# Patient Record
Sex: FEMALE | Race: BLACK OR AFRICAN AMERICAN | NOT HISPANIC OR LATINO | Employment: FULL TIME | URBAN - METROPOLITAN AREA
[De-identification: names, ages, dates, MRNs, and addresses within clinical notes are randomized per-mention and may not be internally consistent; named-entity substitution may affect disease eponyms.]

---
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
## 2022-04-18 ENCOUNTER — HOSPITAL ENCOUNTER (EMERGENCY)
Facility: HOSPITAL | Age: 22
Discharge: HOME | End: 2022-04-18
Attending: EMERGENCY MEDICINE
Payer: MEDICAID

## 2022-04-18 VITALS
TEMPERATURE: 98 F | DIASTOLIC BLOOD PRESSURE: 98 MMHG | WEIGHT: 209 LBS | HEART RATE: 78 BPM | SYSTOLIC BLOOD PRESSURE: 137 MMHG | OXYGEN SATURATION: 100 % | BODY MASS INDEX: 45.09 KG/M2 | RESPIRATION RATE: 18 BRPM | HEIGHT: 57 IN
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
## 2022-04-18 PROCEDURE — 25000003 PHARM REV CODE 250: Performed by: EMERGENCY MEDICINE

## 2022-04-18 PROCEDURE — 99283 EMERGENCY DEPT VISIT LOW MDM: CPT

## 2022-04-18 RX ORDER — ACETAMINOPHEN 500 MG
1000 TABLET ORAL
Status: COMPLETED | OUTPATIENT
Start: 2022-04-18 | End: 2022-04-18

## 2022-04-18 RX ORDER — VALACYCLOVIR HYDROCHLORIDE 1 G/1
1000 TABLET, FILM COATED ORAL DAILY
COMMUNITY
Start: 2022-03-22 | End: 2023-03-19 | Stop reason: CLARIF

## 2022-04-18 RX ADMIN — ACETAMINOPHEN 1000 MG: 500 TABLET, FILM COATED ORAL at 11:04

## 2022-04-18 NOTE — Clinical Note
"Margret Soto" Evans was seen and treated in our emergency department on 4/18/2022.  She may return to work on 04/20/2022.       If you have any questions or concerns, please don't hesitate to call.      danny beckett MD    "

## 2022-04-18 NOTE — Clinical Note
"Margret Wright (Dajanna)armando was seen and treated in our emergency department on 4/18/2022.  She may return to work on 04/20/2022.       If you have any questions or concerns, please don't hesitate to call.      danny beckett RN    "
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
## 2024-07-12 ENCOUNTER — TELEPHONE (OUTPATIENT)
Dept: MATERNAL FETAL MEDICINE | Facility: CLINIC | Age: 24
End: 2024-07-12
Payer: MEDICAID

## 2024-07-12 DIAGNOSIS — O24.419 GESTATIONAL DIABETES MELLITUS (GDM) IN SECOND TRIMESTER, GESTATIONAL DIABETES METHOD OF CONTROL UNSPECIFIED: Primary | ICD-10-CM

## 2024-07-14 ENCOUNTER — HOSPITAL ENCOUNTER (EMERGENCY)
Facility: HOSPITAL | Age: 24
Discharge: LEFT BEFORE DISCHARGE | End: 2024-07-14
Attending: EMERGENCY MEDICINE
Payer: MEDICAID

## 2024-07-14 VITALS
HEIGHT: 55 IN | TEMPERATURE: 98 F | RESPIRATION RATE: 16 BRPM | DIASTOLIC BLOOD PRESSURE: 81 MMHG | WEIGHT: 222 LBS | OXYGEN SATURATION: 97 % | HEART RATE: 89 BPM | BODY MASS INDEX: 51.38 KG/M2 | SYSTOLIC BLOOD PRESSURE: 117 MMHG

## 2024-07-14 PROCEDURE — 99900041 HC LEFT WITHOUT BEING SEEN- EMERGENCY

## 2024-07-14 NOTE — ED NOTES
Called pt. Cell phone to notify room available in ED, pt. Reports she was removed from ED lobby by hospital security, and is on her way to [facility]. House Supervisor notified.

## 2024-07-15 ENCOUNTER — TELEPHONE (OUTPATIENT)
Dept: MATERNAL FETAL MEDICINE | Facility: CLINIC | Age: 24
End: 2024-07-15
Payer: MEDICAID

## 2024-07-15 ENCOUNTER — PATIENT MESSAGE (OUTPATIENT)
Dept: MATERNAL FETAL MEDICINE | Facility: CLINIC | Age: 24
End: 2024-07-15
Payer: MEDICAID

## 2024-07-15 DIAGNOSIS — O24.419 GESTATIONAL DIABETES MELLITUS (GDM) IN SECOND TRIMESTER, GESTATIONAL DIABETES METHOD OF CONTROL UNSPECIFIED: Primary | ICD-10-CM

## 2024-07-15 NOTE — TELEPHONE ENCOUNTER
Called patient based on referral from Dr Martinez office.    Patient states Dr Martinez told her she was very high risk due to her diabetes and we would be seeing her every week.    Explained to patient that we will start with first appointment and let Dr Velasquez evaluate her.    History obtained and updated in Epic.    Appointment with ultrasound scheduled for 7/17/24    Will send [employer] message with contact info and office location.

## 2024-07-17 ENCOUNTER — PROCEDURE VISIT (OUTPATIENT)
Dept: MATERNAL FETAL MEDICINE | Facility: CLINIC | Age: 24
End: 2024-07-17
Payer: MEDICAID

## 2024-07-17 ENCOUNTER — TELEPHONE (OUTPATIENT)
Dept: MATERNAL FETAL MEDICINE | Facility: CLINIC | Age: 24
End: 2024-07-17

## 2024-07-17 ENCOUNTER — OFFICE VISIT (OUTPATIENT)
Dept: MATERNAL FETAL MEDICINE | Facility: CLINIC | Age: 24
End: 2024-07-17
Payer: MEDICAID

## 2024-07-17 DIAGNOSIS — O99.212 OBESITY AFFECTING PREGNANCY IN SECOND TRIMESTER, UNSPECIFIED OBESITY TYPE: ICD-10-CM

## 2024-07-17 DIAGNOSIS — O24.410 DIET CONTROLLED GESTATIONAL DIABETES MELLITUS (GDM) IN SECOND TRIMESTER: ICD-10-CM

## 2024-07-17 DIAGNOSIS — O24.419 GESTATIONAL DIABETES MELLITUS (GDM) IN SECOND TRIMESTER, GESTATIONAL DIABETES METHOD OF CONTROL UNSPECIFIED: ICD-10-CM

## 2024-07-17 DIAGNOSIS — Z36.89 ENCOUNTER FOR FETAL ANATOMIC SURVEY: Primary | ICD-10-CM

## 2024-07-17 DIAGNOSIS — O24.419 GESTATIONAL DIABETES MELLITUS (GDM) IN SECOND TRIMESTER, GESTATIONAL DIABETES METHOD OF CONTROL UNSPECIFIED: Primary | ICD-10-CM

## 2024-07-17 PROCEDURE — 99213 OFFICE O/P EST LOW 20 MIN: CPT | Mod: PBBFAC,TH,PN | Performed by: STUDENT IN AN ORGANIZED HEALTH CARE EDUCATION/TRAINING PROGRAM

## 2024-07-17 PROCEDURE — 99999 PR PBB SHADOW E&M-EST. PATIENT-LVL III: CPT | Mod: PBBFAC,,, | Performed by: STUDENT IN AN ORGANIZED HEALTH CARE EDUCATION/TRAINING PROGRAM

## 2024-07-17 PROCEDURE — 76805 OB US >/= 14 WKS SNGL FETUS: CPT | Mod: PBBFAC,PN | Performed by: STUDENT IN AN ORGANIZED HEALTH CARE EDUCATION/TRAINING PROGRAM

## 2024-07-17 NOTE — ASSESSMENT & PLAN NOTE
Gestational Diabetes  I counseled the patient regarding the risks of gestational diabetes, which include macrosomia, hypertensive disorders of pregnancy,  hypoglycemia, shoulder dystocia/birth trauma, polyhydramnios, and increased risk of  delivery.  Patients requiring medical therapy have an increased risk of stillbirth.  Discussed that  outcome is linked to her ability to achieve glycemic control.  The goal of treatment is to have a fasting blood sugar between 70 and 95 mg/dL and 2 hour postprandials less than 120 mg/dL.  Therapy will likely consist of therapy with metformin or insulin. Approximately 30-50% of the time, women who are well-controlled on metformin may require the addition of insulin as the pregnancy progresses. Glyburide therapy has demonstrated inferior results as compared to metformin and insulin, and therefore, is not a first-line choice. Antepartum testing is indicated for those patients requiring medical therapy to reduce the incidence of fetal demise. We discussed dietary counseling and appropriate exercise to improve peripheral insulin resistance. We discussed the frequency of blood sugar monitoring and goal blood sugars. She has not met with a diabetic educator this pregnancy. I would recommend obtaining serial growth ultrasounds in the third trimester to monitor for macrosomia.    Most women with GDM are cured by delivery. All medications can be stopped postpartum. However, some women with GDM have undiagnosed type 2 diabetes. Therefore, I recommend obtaining a postpartum fasting blood sugar prior to discharge. Approximately 20% of women with GDM will have glucose intolerance or type 2 DM at the postpartum visit. A 2 hour glucose tolerance test should be performed 6-8 weeks postpartum. If the patient is breastfeeding, it is reasonable to delay this as appropriate. Additionally, women with GDM are at increased risk of developing type 2 DM later in life. Therefore,  establishing with a primary MD who can perform annual diabetes screening is appropriate.       Diagnosed via early GTT with confirmatory 3 hr GTT. Reports she was screened for diabetes prior to pregnancy and was borderline. BMI of 54 today.   Logs reviewed. Some elevations noted however no need for medications at this point.Likely will need Metformin or insulin.  Recommendations:  MFM will review blood sugars in 1 week via portal; We reinforced checking 4 x/day and reinforced goal blood sugars  Regimen: Diet  Diabetic education referral placed  If medications are required, recommend growth scans every 4-6 weeks, starting at 28 weeks gestation  If medications are required, recommend starting antepartum testing at 32 weeks gestation (weekly NST+AFV or BPP); twice weekly testing is recommended if blood sugars are poorly controlled. She will require testing due to BMI  -Antepartum testing should be started at 40 weeks for women who do NOT require medications.  Check fasting blood sugar in hospital postpartum.   If normal, discontinue therapy and monitoring and recommend repeat postpartum glucose testing in 6-8 weeks postpartum using a 75 g oral glucose tolerance test.   If fasting blood glucose is between 100-125 mg/dl or impaired glucose tolerance is noted on 2 hour glucose test, refer to primary care as appropriate.   An ultrasound for estimated fetal weight should be obtained within 3 weeks of anticipated delivery; if the EFW is >= 4500 grams, a  should be offered.    Delivery timing:  Well controlled with diet: 39 0/7 - 40 6/7 weeks gestation  Oral agent or insulin required: 39 0/7 - 39 6/ 7 weeks gestation  Poorly controlled on oral agent or insulin: 38 0/7 - 38 6/7 weeks gestation

## 2024-07-17 NOTE — TELEPHONE ENCOUNTER
Called pt toe let her know she left her glucometer in the clinic room. Pt stated she is on her way to come pick it up.

## 2024-07-17 NOTE — PROGRESS NOTES
Referral placed for diabetic education and message sent to Gina Miguel and Neeru Baumann.   Blood sugar log scanned to media    Dr Martinez office notified of patient rash and her requesting meds,  Pics of rash are in media.   They stated they will see her 7/31/24.    Informed patient of above.      Regarding rash encouraged her to keep area dry, open to air when possible, use supportive bra to keep breasts from causing friction.  Follow up with Dr Martinez.

## 2024-07-17 NOTE — PROGRESS NOTES
"MATERNAL-FETAL MEDICINE   CONSULT NOTE    Provider requesting consultation: MD Michelle  SUBJECTIVE:   Ms. Margret Krueger is a 24 y.o.  female with IUP at 16w0d who is seen in consultation by MFM for evaluation and management of:  Problem   Gestational Diabetes Mellitus (Gdm) in Second Trimester   Obesity Affecting Pregnancy in Second Trimester        Medication List with Changes/Refills   Current Medications    ACYCLOVIR (ZOVIRAX) 400 MG TABLET    Take 1 tablet (400 mg total) by mouth 5 (five) times daily. for 5 days                        NYSTATIN (MYCOSTATIN) POWDER    Apply topically 2 (two) times daily. To area under breasts    SERTRALINE (ZOLOFT) 25 MG TABLET    Take 25 mg by mouth once daily.     Review of patient's allergies indicates:  No Known Allergies  OB History    Para Term  AB Living   1             SAB IAB Ectopic Multiple Live Births                  # Outcome Date GA Lbr Aquiles/2nd Weight Sex Type Anes PTL Lv   1 Current              Past Medical History:   Diagnosis Date    ADHD     Bipolar 1 disorder     Borderline diabetes      Past Surgical History:   Procedure Laterality Date    ADENOIDECTOMY      CHOLECYSTECTOMY      LAPAROSCOPIC CHOLECYSTECTOMY N/A 2022    Procedure: CHOLECYSTECTOMY, LAPAROSCOPIC;  Surgeon: Niall Mcduffie MD;  Location: [facility];  Service: General;  Laterality: N/A;    TYMPANOSTOMY TUBE PLACEMENT       Family history: negative for birth defects, recurrent miscarriages, chromosomal abnormalities.   Social History     Tobacco Use    Smoking status: Former     Types: Cigarettes, Cigars    Smokeless tobacco: Never    Tobacco comments:     Quit 2 months after getting pregnant   Substance Use Topics    Alcohol use: Not Currently    Drug use: Not Currently     Types: Marijuana     Comment: denies use as of 2023     Review of patient's allergies indicates:  No Known Allergies  Objective:   /79   Pulse 77   Ht 4' 6" (1.372 m)   Wt 102.5 kg " (225 lb 15.5 oz)   LMP 2024 (Exact Date)   BMI 54.48 kg/m²   Ultrasound performed. See viewpoint for full ultrasound report.  A lovett living IUP is identified. CHELSEA is consistent with stated CHELSEA.   A  Limited early anatomy appears unremarkable.  ASSESSMENT/PLAN:     24 y.o.  female with IUP at 16w0d     Gestational diabetes mellitus (GDM) in second trimester  Gestational Diabetes  I counseled the patient regarding the risks of gestational diabetes, which include macrosomia, hypertensive disorders of pregnancy,  hypoglycemia, shoulder dystocia/birth trauma, polyhydramnios, and increased risk of  delivery.  Patients requiring medical therapy have an increased risk of stillbirth.  Discussed that  outcome is linked to her ability to achieve glycemic control.  The goal of treatment is to have a fasting blood sugar between 70 and 95 mg/dL and 2 hour postprandials less than 120 mg/dL.  Therapy will likely consist of therapy with metformin or insulin. Approximately 30-50% of the time, women who are well-controlled on metformin may require the addition of insulin as the pregnancy progresses. Glyburide therapy has demonstrated inferior results as compared to metformin and insulin, and therefore, is not a first-line choice. Antepartum testing is indicated for those patients requiring medical therapy to reduce the incidence of fetal demise. We discussed dietary counseling and appropriate exercise to improve peripheral insulin resistance. We discussed the frequency of blood sugar monitoring and goal blood sugars. She has not met with a diabetic educator this pregnancy. I would recommend obtaining serial growth ultrasounds in the third trimester to monitor for macrosomia.    Most women with GDM are cured by delivery. All medications can be stopped postpartum. However, some women with GDM have undiagnosed type 2 diabetes. Therefore, I recommend obtaining a postpartum fasting blood sugar  prior to discharge. Approximately 20% of women with GDM will have glucose intolerance or type 2 DM at the postpartum visit. A 2 hour glucose tolerance test should be performed 6-8 weeks postpartum. If the patient is breastfeeding, it is reasonable to delay this as appropriate. Additionally, women with GDM are at increased risk of developing type 2 DM later in life. Therefore, establishing with a primary MD who can perform annual diabetes screening is appropriate.       Diagnosed via early GTT with confirmatory 3 hr GTT. Reports she was screened for diabetes prior to pregnancy and was borderline. BMI of 54 today.   Logs reviewed. Some elevations noted however no need for medications at this point.Likely will need Metformin or insulin.  Recommendations:  MFM will review blood sugars in 1 week via portal; We reinforced checking 4 x/day and reinforced goal blood sugars  Regimen: Diet  Diabetic education referral placed  If medications are required, recommend growth scans every 4-6 weeks, starting at 28 weeks gestation  If medications are required, recommend starting antepartum testing at 32 weeks gestation (weekly NST+AFV or BPP); twice weekly testing is recommended if blood sugars are poorly controlled. She will require testing due to BMI  -Antepartum testing should be started at 40 weeks for women who do NOT require medications.  Check fasting blood sugar in hospital postpartum.   If normal, discontinue therapy and monitoring and recommend repeat postpartum glucose testing in 6-8 weeks postpartum using a 75 g oral glucose tolerance test.   If fasting blood glucose is between 100-125 mg/dl or impaired glucose tolerance is noted on 2 hour glucose test, refer to primary care as appropriate.   An ultrasound for estimated fetal weight should be obtained within 3 weeks of anticipated delivery; if the EFW is >= 4500 grams, a  should be offered.    Delivery timing:  Well controlled with diet: 39 0/7 - 40 6/7 weeks  gestation  Oral agent or insulin required: 39 0/7 - 39 6/ 7 weeks gestation  Poorly controlled on oral agent or insulin: 38 0/7 - 38 6/7 weeks gestation      Obesity affecting pregnancy in second trimester  Obesity  Obesity, especially class III obesity (BMI >= 40) is associated with numerous adverse pregnancy outcomes. These include but are not limited to miscarriage, poor ultrasound visualization, increase in congenital malformations (especially NTD's ),  birth, ascending infections, gestational diabetes, hypertensive diseases of pregnancy, macrosomia, shoulder dystocia, cerebral palsy and surgical complications such as wound infections, dehiscence and thrombosis. Macrosomia and the incidence of intrapartum complications related to macrosomia, such as shoulder dystocia, malpresentation, hemorrhage, and fourth degree laceration are also increased in obese gravidas. Obese women also have a high rate of  delivery and are more likely to have surgical, anesthetic, or postpartum complications.  birth in obese gravidas is primarily associated with obesity-related medical and  complications, rather than an intrinsic predisposition to spontaneous  labor.     Recommendations:  Recommended gestational weight gain of 11-20 pounds. Consider dietary counseling.  Initiate low dose aspirin 81 mg daily for preeclampsia risk reduction at 12-16 weeks  Patients should receive adequate counseling regarding risk factors and possible difficulties with accurate labor monitoring,  delivery operative complications, and anesthesia.    Screen for signs/symptoms of obstructive sleep apnea.  Because of the increased risk of congenital anomalies, all patients with a BMI ? 35 should undergo targeted ultrasound between 18-20 weeks gestation. If abnormal screening views or cardiac anomalies are noted, or if the patient has concomitant diabetes, formal fetal echocardiography should be performed.   Fetal  growth assessment via fundal height monitoring is difficult in the setting of obesity. When the fundal height cannot be reliably followed or BMI ? 40, ultrasound evaluation of fetal growth is recommended at 32 weeks or sooner if other co-morbidities or indications exist.   Patients with a BMI of 40 or greater are at the highest risk for adverse pregnancy outcome, including stillbirth. Therefore, weekly  surveillance is recommended beginning at 34 weeks until delivery; other co-morbidities may require more frequent testing.  Patients with obesity are at increased risk for cardiac and pulmonary complications including sleep apnea, hypertension and cardiomyopathy. Providers should have a low threshold to seek further evaluation of the cardiac status--echocardiogram, EKG--in patients who present with symptoms suggestive of cardiac or pulmonary compromise. In addition, given the risk of anesthetic difficulties, consultation with an anesthesiologist is suggested for patients with a BMI ? 50, maternal weight ?400 lb, or patients with sleep apnea.  Obese patients should be followed closely especially in the third trimester to screen for hypertensive complications.  Lovenox 40 mg BID for VTE prophylaxis while admitted to the hospital (antepartum or postpartum) if BMI >= 40.   Patients should be counseled on weight loss in the postpartum period.      Of note, seen in ER for rash below breasts and given Nystatin powder. She reports minimal relief.Will reach out to Dr. Martinez office today to have her f/u     FOLLOW UP:   F/u in 18-20 weeks for targeted anatomy US/MFM visit      Norma Velasquez  Maternal-Fetal Medicine    Electronically Signed by Norma Velasquez 2024

## 2024-07-17 NOTE — ASSESSMENT & PLAN NOTE
Obesity  Obesity, especially class III obesity (BMI >= 40) is associated with numerous adverse pregnancy outcomes. These include but are not limited to miscarriage, poor ultrasound visualization, increase in congenital malformations (especially NTD's ),  birth, ascending infections, gestational diabetes, hypertensive diseases of pregnancy, macrosomia, shoulder dystocia, cerebral palsy and surgical complications such as wound infections, dehiscence and thrombosis. Macrosomia and the incidence of intrapartum complications related to macrosomia, such as shoulder dystocia, malpresentation, hemorrhage, and fourth degree laceration are also increased in obese gravidas. Obese women also have a high rate of  delivery and are more likely to have surgical, anesthetic, or postpartum complications.  birth in obese gravidas is primarily associated with obesity-related medical and  complications, rather than an intrinsic predisposition to spontaneous  labor.     Recommendations:  Recommended gestational weight gain of 11-20 pounds. Consider dietary counseling.  Initiate low dose aspirin 81 mg daily for preeclampsia risk reduction at 12-16 weeks  Patients should receive adequate counseling regarding risk factors and possible difficulties with accurate labor monitoring,  delivery operative complications, and anesthesia.    Screen for signs/symptoms of obstructive sleep apnea.  Because of the increased risk of congenital anomalies, all patients with a BMI ? 35 should undergo targeted ultrasound between 18-20 weeks gestation. If abnormal screening views or cardiac anomalies are noted, or if the patient has concomitant diabetes, formal fetal echocardiography should be performed.   Fetal growth assessment via fundal height monitoring is difficult in the setting of obesity. When the fundal height cannot be reliably followed or BMI ? 40, ultrasound evaluation of fetal growth is recommended at  32 weeks or sooner if other co-morbidities or indications exist.   Patients with a BMI of 40 or greater are at the highest risk for adverse pregnancy outcome, including stillbirth. Therefore, weekly  surveillance is recommended beginning at 34 weeks until delivery; other co-morbidities may require more frequent testing.  Patients with obesity are at increased risk for cardiac and pulmonary complications including sleep apnea, hypertension and cardiomyopathy. Providers should have a low threshold to seek further evaluation of the cardiac status--echocardiogram, EKG--in patients who present with symptoms suggestive of cardiac or pulmonary compromise. In addition, given the risk of anesthetic difficulties, consultation with an anesthesiologist is suggested for patients with a BMI ? 50, maternal weight ?400 lb, or patients with sleep apnea.  Obese patients should be followed closely especially in the third trimester to screen for hypertensive complications.  Lovenox 40 mg BID for VTE prophylaxis while admitted to the hospital (antepartum or postpartum) if BMI >= 40.   Patients should be counseled on weight loss in the postpartum period.

## 2024-07-18 VITALS
SYSTOLIC BLOOD PRESSURE: 124 MMHG | WEIGHT: 226 LBS | HEIGHT: 60 IN | BODY MASS INDEX: 44.37 KG/M2 | HEART RATE: 77 BPM | DIASTOLIC BLOOD PRESSURE: 79 MMHG

## 2024-08-01 ENCOUNTER — PROCEDURE VISIT (OUTPATIENT)
Dept: MATERNAL FETAL MEDICINE | Facility: CLINIC | Age: 24
End: 2024-08-01
Payer: MEDICAID

## 2024-08-01 ENCOUNTER — OFFICE VISIT (OUTPATIENT)
Dept: MATERNAL FETAL MEDICINE | Facility: CLINIC | Age: 24
End: 2024-08-01
Payer: MEDICAID

## 2024-08-01 VITALS
BODY MASS INDEX: 52.55 KG/M2 | DIASTOLIC BLOOD PRESSURE: 77 MMHG | HEIGHT: 55 IN | WEIGHT: 227.06 LBS | SYSTOLIC BLOOD PRESSURE: 127 MMHG

## 2024-08-01 VITALS
DIASTOLIC BLOOD PRESSURE: 77 MMHG | HEIGHT: 56 IN | BODY MASS INDEX: 50.66 KG/M2 | HEART RATE: 81 BPM | SYSTOLIC BLOOD PRESSURE: 127 MMHG

## 2024-08-01 DIAGNOSIS — Z36.2 ENCOUNTER FOR FOLLOW-UP ULTRASOUND OF FETAL ANATOMY: Primary | ICD-10-CM

## 2024-08-01 DIAGNOSIS — O24.410 DIET CONTROLLED GESTATIONAL DIABETES MELLITUS (GDM) IN SECOND TRIMESTER: ICD-10-CM

## 2024-08-01 DIAGNOSIS — O99.212 OBESITY AFFECTING PREGNANCY IN SECOND TRIMESTER, UNSPECIFIED OBESITY TYPE: ICD-10-CM

## 2024-08-01 DIAGNOSIS — O24.410 DIET CONTROLLED GESTATIONAL DIABETES MELLITUS (GDM) IN SECOND TRIMESTER: Primary | ICD-10-CM

## 2024-08-01 DIAGNOSIS — O24.419 GESTATIONAL DIABETES MELLITUS (GDM) IN SECOND TRIMESTER, GESTATIONAL DIABETES METHOD OF CONTROL UNSPECIFIED: ICD-10-CM

## 2024-08-01 DIAGNOSIS — Z36.89 ENCOUNTER FOR FETAL ANATOMIC SURVEY: ICD-10-CM

## 2024-08-01 PROCEDURE — 76811 OB US DETAILED SNGL FETUS: CPT | Mod: PBBFAC,PN | Performed by: STUDENT IN AN ORGANIZED HEALTH CARE EDUCATION/TRAINING PROGRAM

## 2024-08-01 NOTE — PROGRESS NOTES
"Maternal Fetal Medicine Follow up  SUBJECTIVE:     Margret Krueger is a 24 y.o.  female with IUP at 18w1d who is seen for MFM follow up for management of:    Problem   Obesity Affecting Pregnancy in Second Trimester   Gestational Diabetes Mellitus (Gdm) in Second Trimester     Previous notes reviewed.   No changes to medical, surgical, family, social, or obstetric history.    Review of patient's allergies indicates:   Allergen Reactions    Ether for anesthesia Other (See Comments)     "The medicine when you get put to sleep for surgery. It makes me jump."     Care team members:  MD Michelle - Primary OB  OBJECTIVE:   Blood Pressure: LMP 2024 (Exact Date)   Ultrasound performed. See viewpoint for full ultrasound report.  A detailed fetal anatomic ultrasound examination was performed for the following high risk indication: high maternal BMI.   No fetal structural malformations are identified; however, fetal imaging is incomplete today.   A follow-up study will be scheduled to complete the fetal anatomic survey.   Fetal size today is consistent with established gestational age.   Cervical length by TA scanning is normal.   Placental location is anterior without evidence of previa.   ASSESSMENT/PLAN:     24 y.o.  female with IUP at 18w1d presents for MFM follow up.    Gestational diabetes mellitus (GDM) in second trimester  Gestational Diabetes  Previously counseled- see prior notes for full recommendations  Margret does not have her blood glucose logs with her with the exception of the last 2 days. I reviewed these values. She has one elevated fasting value this morning at 95 and a 2 hr postprandial dinner value at 120. Other values within normal limits. I reinforced importance of logging and we have given her another blood glucose log  Recommendations:  MFM will review blood sugars in 1 week virtually. We reinforced checking 4 x/day and reinforced goal blood sugars  Regimen: Diet  She missed her diabetic " education visit- primary OB to follow up        Obesity affecting pregnancy in second trimester  Previously counseled    FOLLOW UP:   1 wk virtual visit for BG assessment  4 wk f/u anatomy and MD visit  Norma Velasquez  Maternal-Fetal Medicine    Electronically Signed by Norma Velasquez August 1, 2024

## 2024-08-01 NOTE — PROGRESS NOTES
"Pt with mother at bedside. Mother states patients brother has a heart murmur and "caught a heart attack " when he was "little". Reports around age 6 or 7. Brother is currently 24 years old.  Mother also reports having a heart attack at age 39.   Pt states she stopped smoking but has started back. Requests medicine to help her stop smoking. States she has stopped taking Zoloft because she has not seen the PCP lately and due to pregnancy.   BS check today. Levels recorded on phone.       7/30/24 - 80, 115, 120    7/31/24-  91, 108, 105    8/1/24-    95  "
97.5

## 2024-08-05 DIAGNOSIS — O24.410 DIET CONTROLLED GESTATIONAL DIABETES MELLITUS (GDM) IN SECOND TRIMESTER: ICD-10-CM

## 2024-08-05 DIAGNOSIS — O24.419 GESTATIONAL DIABETES MELLITUS (GDM) IN SECOND TRIMESTER, GESTATIONAL DIABETES METHOD OF CONTROL UNSPECIFIED: Primary | ICD-10-CM

## 2024-08-07 ENCOUNTER — OFFICE VISIT (OUTPATIENT)
Dept: MATERNAL FETAL MEDICINE | Facility: CLINIC | Age: 24
End: 2024-08-07
Payer: MEDICAID

## 2024-08-07 ENCOUNTER — PATIENT MESSAGE (OUTPATIENT)
Dept: MATERNAL FETAL MEDICINE | Facility: CLINIC | Age: 24
End: 2024-08-07

## 2024-08-07 DIAGNOSIS — O24.419 GESTATIONAL DIABETES MELLITUS (GDM) IN SECOND TRIMESTER, GESTATIONAL DIABETES METHOD OF CONTROL UNSPECIFIED: Primary | ICD-10-CM

## 2024-08-07 DIAGNOSIS — O24.410 DIET CONTROLLED GESTATIONAL DIABETES MELLITUS (GDM) IN SECOND TRIMESTER: Primary | ICD-10-CM

## 2024-08-07 RX ORDER — PEN NEEDLE, DIABETIC 29 G X1/2"
NEEDLE, DISPOSABLE MISCELLANEOUS
COMMUNITY
Start: 2024-08-07

## 2024-08-13 ENCOUNTER — PATIENT MESSAGE (OUTPATIENT)
Dept: MATERNAL FETAL MEDICINE | Facility: CLINIC | Age: 24
End: 2024-08-13
Payer: MEDICAID

## 2024-08-19 ENCOUNTER — TELEPHONE (OUTPATIENT)
Dept: MATERNAL FETAL MEDICINE | Facility: CLINIC | Age: 24
End: 2024-08-19
Payer: MEDICAID

## 2024-08-19 NOTE — TELEPHONE ENCOUNTER
Spoke with Margret and asked her to send in a week of blood sugars as we had not seen them recently.    She said she would.     Reminded her to check her Tapp[employer] messages.

## 2024-08-19 NOTE — TELEPHONE ENCOUNTER
Patient sent in blood sugars.   Transcribed on to bs form.  Dr Velasquez reviewed.   No changes to regimen at this time.    Patient notified and will send in blood sugars again x 1 week.

## 2024-08-22 ENCOUNTER — TELEPHONE (OUTPATIENT)
Dept: MATERNAL FETAL MEDICINE | Facility: CLINIC | Age: 24
End: 2024-08-22
Payer: MEDICAID

## 2024-08-22 NOTE — TELEPHONE ENCOUNTER
Pt called and stated she has school on Thursday 8/29 and needs a different appt time and asked if we had any appts available on Tuesday 8/27. Advised we have an appt on Tuesday at 2:40pm. Pt said she will ask her mom if she can bring her at that time and will call us back.

## 2024-08-27 ENCOUNTER — PROCEDURE VISIT (OUTPATIENT)
Dept: MATERNAL FETAL MEDICINE | Facility: CLINIC | Age: 24
End: 2024-08-27
Payer: MEDICAID

## 2024-08-27 DIAGNOSIS — O24.419 GESTATIONAL DIABETES MELLITUS (GDM) IN SECOND TRIMESTER, GESTATIONAL DIABETES METHOD OF CONTROL UNSPECIFIED: ICD-10-CM

## 2024-08-27 DIAGNOSIS — Z36.2 ENCOUNTER FOR FOLLOW-UP ULTRASOUND OF FETAL ANATOMY: ICD-10-CM

## 2024-08-27 PROCEDURE — 76816 OB US FOLLOW-UP PER FETUS: CPT | Mod: PBBFAC,PN | Performed by: STUDENT IN AN ORGANIZED HEALTH CARE EDUCATION/TRAINING PROGRAM

## 2024-09-03 ENCOUNTER — OFFICE VISIT (OUTPATIENT)
Dept: MATERNAL FETAL MEDICINE | Facility: CLINIC | Age: 24
End: 2024-09-03
Payer: MEDICAID

## 2024-09-03 ENCOUNTER — TELEPHONE (OUTPATIENT)
Dept: MATERNAL FETAL MEDICINE | Facility: CLINIC | Age: 24
End: 2024-09-03
Payer: MEDICAID

## 2024-09-03 ENCOUNTER — PATIENT MESSAGE (OUTPATIENT)
Dept: MATERNAL FETAL MEDICINE | Facility: CLINIC | Age: 24
End: 2024-09-03

## 2024-09-03 DIAGNOSIS — O24.410 DIET CONTROLLED GESTATIONAL DIABETES MELLITUS (GDM) IN SECOND TRIMESTER: Primary | ICD-10-CM

## 2024-09-03 PROCEDURE — 99213 OFFICE O/P EST LOW 20 MIN: CPT | Mod: TH,95,, | Performed by: STUDENT IN AN ORGANIZED HEALTH CARE EDUCATION/TRAINING PROGRAM

## 2024-09-03 PROCEDURE — 1160F RVW MEDS BY RX/DR IN RCRD: CPT | Mod: CPTII,95,, | Performed by: STUDENT IN AN ORGANIZED HEALTH CARE EDUCATION/TRAINING PROGRAM

## 2024-09-03 PROCEDURE — 1159F MED LIST DOCD IN RCRD: CPT | Mod: CPTII,95,, | Performed by: STUDENT IN AN ORGANIZED HEALTH CARE EDUCATION/TRAINING PROGRAM

## 2024-09-03 NOTE — PROGRESS NOTES
"The patient location is: Home- office  The chief complaint leading to consultation is: Blood sugar assessment    Visit type: audiovisual    Face to Face time with patient: 10  20 minutes of total time spent on the encounter, which includes face to face time and non-face to face time preparing to see the patient (eg, review of tests), Obtaining and/or reviewing separately obtained history, Documenting clinical information in the electronic or other health record, Independently interpreting results (not separately reported) and communicating results to the patient/family/caregiver, or Care coordination (not separately reported).         Each patient to whom he or she provides medical services by telemedicine is:  (1) informed of the relationship between the physician and patient and the respective role of any other health care provider with respect to management of the patient; and (2) notified that he or she may decline to receive medical services by telemedicine and may withdraw from such care at any time.    Notes:     Maternal Fetal Medicine Follow up  SUBJECTIVE:     Margret Krueger is a 24 y.o.  female with IUP at 22w6d who is seen for MFM follow up for management of:    Problem   Gestational Diabetes Mellitus (Gdm) in Second Trimester     Previous notes reviewed.   No changes to medical, surgical, family, social, or obstetric history.    Interval history since last MFM visit: She missed her diabetic education visit    Review of patient's allergies indicates:   Allergen Reactions    Ether for anesthesia Other (See Comments)     "The medicine when you get put to sleep for surgery. It makes me jump."     Care team members:  MD Michelle - Primary OB  ASSESSMENT/PLAN:     24 y.o.  female with IUP at 22w6d presents for MFM follow up.    Gestational diabetes mellitus (GDM) in second trimester  Gestational Diabetes  Previously counseled- see prior notes for full recommendations  Margret has 4 days  of blood " glucose logs. All within normal range except one value. Her values are similar to her last visit.  She had many questions about diet and what to eat. She missed her diabetic education visit so I've placed another referral.  Recommendations:  MFM will review blood sugars in 2 week via portal. We reinforced checking 4 x/day and reinforced goal blood sugars  Regimen: Diet  Referral to diabetic education  Follow up growth US in 4 weeks with MFM visit        FOLLOW UP:   BS assessment in 2 weeks  Growth US with MFM in 4 weeks  Norma Velasquez  Maternal-Fetal Medicine    Electronically Signed by Norma Velasquez September 3, 2024

## 2024-09-03 NOTE — ASSESSMENT & PLAN NOTE
Gestational Diabetes  Previously counseled- see prior notes for full recommendations  Margret has 4 days  of blood glucose logs. All within normal range except one value. Her values are similar to her last visit.  She had many questions about diet and what to eat. She missed her diabetic education visit so I've placed another referral.  Recommendations:  MFM will review blood sugars in 2 week via portal. We reinforced checking 4 x/day and reinforced goal blood sugars  Regimen: Diet  Referral to diabetic education  Follow up growth US in 4 weeks with MFM visit

## 2024-09-04 ENCOUNTER — DOCUMENTATION ONLY (OUTPATIENT)
Dept: MATERNAL FETAL MEDICINE | Facility: CLINIC | Age: 24
End: 2024-09-04
Payer: MEDICAID

## 2024-09-04 NOTE — PROGRESS NOTES
"Spoke with patient this morning. Pt reports fasting  after going to a "buffet at about 8 o'clock". Pt confirms she has not attended diabetic education. No Show appt noted in chart from 7/22/24. Pt agrees to virtual diabetic education visit. Referral for diabetic education noted in chart.   Message sent to Diab. Educator for scheduling.     Pt instructed to record blood sugars for review. Monitoring 4 x daily reinforced with good hand washing. Pt verbalizes understanding.  Pt instructed to send blood sugar record to [facility] via patient portal.   "

## 2024-09-13 ENCOUNTER — PATIENT MESSAGE (OUTPATIENT)
Dept: MATERNAL FETAL MEDICINE | Facility: CLINIC | Age: 24
End: 2024-09-13
Payer: MEDICAID

## 2024-09-13 ENCOUNTER — TELEPHONE (OUTPATIENT)
Dept: MATERNAL FETAL MEDICINE | Facility: CLINIC | Age: 24
End: 2024-09-13
Payer: MEDICAID

## 2024-09-13 NOTE — TELEPHONE ENCOUNTER
Called patient back regarding her blood sugars.    She is currently in [facility] her baby wasn't moving.  Pt admits to not having eaten all day.    Reinforced need for patient to eat regular meals and to fill out the blood sugar sheet.  Sent her another sheet thru [facility].  She states baby is moving well now.    Will fu with her and her blood sugars on Monday in anticipation of there virtual visit with us on Tuesday 9/17

## 2024-09-17 ENCOUNTER — TELEPHONE (OUTPATIENT)
Dept: MATERNAL FETAL MEDICINE | Facility: CLINIC | Age: 24
End: 2024-09-17
Payer: MEDICAID

## 2024-09-17 ENCOUNTER — DOCUMENTATION ONLY (OUTPATIENT)
Dept: MATERNAL FETAL MEDICINE | Facility: CLINIC | Age: 24
End: 2024-09-17
Payer: MEDICAID

## 2024-09-17 NOTE — TELEPHONE ENCOUNTER
"Called pt to discuss BS log, asked if she had BS log for Sept 4-13. Pt stated she doesn't have those. Also, asked for clarification on why she only has 3 BS logged per day and what meal she isn't taking them for. Pt stated "she falls asleep before eating dinner" and doesn't take that one. Advised, I will make a note of this for the  and reminded her of her virtual appt today at 1pm.   "

## 2024-09-17 NOTE — TELEPHONE ENCOUNTER
Called pt to remind her to send in blood sugars prior to virtual appt today. Pt stated she missed a few because she didn't eat a meal yesterday. Advised pt to send the BS log that she has for the last 7 days and to make sure they are dated. Pt verbalized understanding.

## 2024-09-17 NOTE — TELEPHONE ENCOUNTER
Called pt to let her know that her virtual for today has been cancelled and we will see her next Wednesday for an in person appt and an US following her diabetic education appt this Thursday. Pt verbalized understanding.

## 2024-09-17 NOTE — PROGRESS NOTES
Patient sent in blood sugars without times or dates on them and only sent in 4 days worth, some of which are identical to ones sent last week.    Dr Velasquez said not enough information to have a visit today.  Patient should go to diabetic education as scheduled and return for her scheduled appt with us on 9/25.    Diabetic education notified of possible need for Dexcom. And to write sugars down from that, on our form and submit them weekly.

## 2024-09-24 ENCOUNTER — PATIENT MESSAGE (OUTPATIENT)
Dept: MATERNAL FETAL MEDICINE | Facility: CLINIC | Age: 24
End: 2024-09-24
Payer: MEDICAID

## 2024-09-25 ENCOUNTER — PROCEDURE VISIT (OUTPATIENT)
Dept: MATERNAL FETAL MEDICINE | Facility: CLINIC | Age: 24
End: 2024-09-25
Payer: MEDICAID

## 2024-09-25 ENCOUNTER — OFFICE VISIT (OUTPATIENT)
Dept: MATERNAL FETAL MEDICINE | Facility: CLINIC | Age: 24
End: 2024-09-25
Payer: MEDICAID

## 2024-09-25 VITALS
HEART RATE: 103 BPM | HEIGHT: 55 IN | WEIGHT: 235.88 LBS | BODY MASS INDEX: 54.59 KG/M2 | DIASTOLIC BLOOD PRESSURE: 79 MMHG | SYSTOLIC BLOOD PRESSURE: 124 MMHG

## 2024-09-25 DIAGNOSIS — O24.410 DIET CONTROLLED GESTATIONAL DIABETES MELLITUS (GDM) IN SECOND TRIMESTER: ICD-10-CM

## 2024-09-25 DIAGNOSIS — O24.414 INSULIN CONTROLLED GESTATIONAL DIABETES MELLITUS (GDM) IN SECOND TRIMESTER: Primary | ICD-10-CM

## 2024-09-25 DIAGNOSIS — Z36.89 ENCOUNTER FOR ULTRASOUND TO ASSESS FETAL GROWTH: ICD-10-CM

## 2024-09-25 PROCEDURE — 3074F SYST BP LT 130 MM HG: CPT | Mod: CPTII,,, | Performed by: STUDENT IN AN ORGANIZED HEALTH CARE EDUCATION/TRAINING PROGRAM

## 2024-09-25 PROCEDURE — 1160F RVW MEDS BY RX/DR IN RCRD: CPT | Mod: CPTII,,, | Performed by: STUDENT IN AN ORGANIZED HEALTH CARE EDUCATION/TRAINING PROGRAM

## 2024-09-25 PROCEDURE — 99999 PR PBB SHADOW E&M-EST. PATIENT-LVL III: CPT | Mod: PBBFAC,,, | Performed by: STUDENT IN AN ORGANIZED HEALTH CARE EDUCATION/TRAINING PROGRAM

## 2024-09-25 PROCEDURE — 76816 OB US FOLLOW-UP PER FETUS: CPT | Mod: PBBFAC,PN | Performed by: STUDENT IN AN ORGANIZED HEALTH CARE EDUCATION/TRAINING PROGRAM

## 2024-09-25 PROCEDURE — 99213 OFFICE O/P EST LOW 20 MIN: CPT | Mod: PBBFAC,TH,PN | Performed by: STUDENT IN AN ORGANIZED HEALTH CARE EDUCATION/TRAINING PROGRAM

## 2024-09-25 PROCEDURE — 99214 OFFICE O/P EST MOD 30 MIN: CPT | Mod: S$PBB,TH,, | Performed by: STUDENT IN AN ORGANIZED HEALTH CARE EDUCATION/TRAINING PROGRAM

## 2024-09-25 PROCEDURE — 3078F DIAST BP <80 MM HG: CPT | Mod: CPTII,,, | Performed by: STUDENT IN AN ORGANIZED HEALTH CARE EDUCATION/TRAINING PROGRAM

## 2024-09-25 PROCEDURE — 3008F BODY MASS INDEX DOCD: CPT | Mod: CPTII,,, | Performed by: STUDENT IN AN ORGANIZED HEALTH CARE EDUCATION/TRAINING PROGRAM

## 2024-09-25 PROCEDURE — 1159F MED LIST DOCD IN RCRD: CPT | Mod: CPTII,,, | Performed by: STUDENT IN AN ORGANIZED HEALTH CARE EDUCATION/TRAINING PROGRAM

## 2024-09-25 RX ORDER — INSULIN HUMAN 100 [IU]/ML
4 INJECTION, SUSPENSION SUBCUTANEOUS 2 TIMES DAILY
Qty: 2.4 ML | Refills: 11 | Status: SHIPPED | OUTPATIENT
Start: 2024-09-25 | End: 2025-09-25

## 2024-09-25 RX ORDER — PEN NEEDLE, DIABETIC 30 GX3/16"
4 NEEDLE, DISPOSABLE MISCELLANEOUS NIGHTLY
Qty: 90 EACH | Refills: 2 | Status: SHIPPED | OUTPATIENT
Start: 2024-09-25

## 2024-09-25 NOTE — ASSESSMENT & PLAN NOTE
Gestational Diabetes  Previously counseled- see prior notes for full recommendations  Margret has met with diabetic education and now has a CGM.  Although she does not have a full week blood sugar logs however continues to have many elevations. Logs are scanned in.    Recommendations:  MFM will review blood sugars in 1 week via portal. We reinforced checking 4 x/day and reinforced goal blood sugars  Regimen: Start on NPH 4/4. Pharmacy teaching ordered  Continue following with diabetic education  Follow up growth US in 4 weeks with MFM visit

## 2024-09-25 NOTE — PROGRESS NOTES
"Maternal Fetal Medicine Follow up  SUBJECTIVE:     Margret Krueger is a 24 y.o.  female with IUP at 26w0d who is seen for MFM follow up for management of:    Problem   Gestational Diabetes Mellitus (Gdm) in Second Trimester     Previous notes reviewed.   No changes to medical, surgical, family, social, or obstetric history.        Review of patient's allergies indicates:   Allergen Reactions    Ether for anesthesia Other (See Comments)     "The medicine when you get put to sleep for surgery. It makes me jump."     Care team members:  Dr. Martinez - Primary OB  OBJECTIVE:   Blood Pressure: /79   Pulse 103   Ht 4' 6" (1.372 m)   Wt 107 kg (235 lb 14.3 oz)   LMP 2024 (Exact Date)   BMI 56.88 kg/m²   Ultrasound performed. See viewpoint for full ultrasound report.  Fetal size is AGA with the EFW at the 45% and the AC at the 31%. The EFW is 870 g.  A limited repeat fetal anatomic survey shows no abnormalities of the structures that were adequately imaged.  AFV is normal.   ASSESSMENT/PLAN:     24 y.o.  female with IUP at 26w0d presents for MFM follow up.    Gestational diabetes mellitus (GDM) in second trimester  Gestational Diabetes  Previously counseled- see prior notes for full recommendations  Margret has met with diabetic education and now has a CGM.  Although she does not have a full week blood sugar logs however continues to have many elevations. Logs are scanned in.    Recommendations:  MFM will review blood sugars in 1 week via portal. We reinforced checking 4 x/day and reinforced goal blood sugars  Regimen: Start on NPH 4/4. Pharmacy teaching ordered  Continue following with diabetic education  Follow up growth US in 4 weeks with MFM visit        FOLLOW UP:   F/u in 4 weeks for US/MFM visit    .  Norma Velasquez  Maternal-Fetal Medicine    Electronically Signed by Norma Velasquez 2024      "

## 2024-09-26 ENCOUNTER — PATIENT MESSAGE (OUTPATIENT)
Dept: MATERNAL FETAL MEDICINE | Facility: CLINIC | Age: 24
End: 2024-09-26
Payer: MEDICAID

## 2024-10-01 ENCOUNTER — TELEPHONE (OUTPATIENT)
Dept: MATERNAL FETAL MEDICINE | Facility: CLINIC | Age: 24
End: 2024-10-01
Payer: MEDICAID

## 2024-10-01 ENCOUNTER — PATIENT MESSAGE (OUTPATIENT)
Dept: MATERNAL FETAL MEDICINE | Facility: CLINIC | Age: 24
End: 2024-10-01
Payer: MEDICAID

## 2024-10-01 NOTE — TELEPHONE ENCOUNTER
Spoke with pharmacist twice regariding prior auth needed on NPH insulin and asked were there any other generic equivalents.    Pharmacist said no NPH comes in generic and closest would be Lantus but would require different dosing.    Prior auth additional information sent over by Angy Hong MA.    Dr Martinez office aware of our continued effort to obtain NPH insulin for patient

## 2024-10-01 NOTE — TELEPHONE ENCOUNTER
Promise from [facility] called to say they got Humulin N kwik pens approved and Rx is ready for  at no cost to patient.    I thanked her and called pt to tell her.    Dapsone Pregnancy And Lactation Text: This medication is Pregnancy Category C and is not considered safe during pregnancy or breast feeding.

## 2024-10-04 ENCOUNTER — PATIENT MESSAGE (OUTPATIENT)
Dept: MATERNAL FETAL MEDICINE | Facility: CLINIC | Age: 24
End: 2024-10-04
Payer: MEDICAID

## 2024-10-04 ENCOUNTER — DOCUMENTATION ONLY (OUTPATIENT)
Dept: MATERNAL FETAL MEDICINE | Facility: CLINIC | Age: 24
End: 2024-10-04
Payer: MEDICAID

## 2024-10-04 ENCOUNTER — TELEPHONE (OUTPATIENT)
Dept: MATERNAL FETAL MEDICINE | Facility: CLINIC | Age: 24
End: 2024-10-04
Payer: MEDICAID

## 2024-10-04 ENCOUNTER — HOSPITAL ENCOUNTER (INPATIENT)
Facility: OTHER | Age: 24
LOS: 2 days | Discharge: HOME | DRG: 833 | End: 2024-10-06
Attending: STUDENT IN AN ORGANIZED HEALTH CARE EDUCATION/TRAINING PROGRAM | Admitting: OBSTETRICS & GYNECOLOGY
Payer: MEDICAID

## 2024-10-04 DIAGNOSIS — Z3A.27 27 WEEKS GESTATION OF PREGNANCY: ICD-10-CM

## 2024-10-04 DIAGNOSIS — O24.419 GESTATIONAL DIABETES MELLITUS (GDM) IN SECOND TRIMESTER: Primary | ICD-10-CM

## 2024-10-04 DIAGNOSIS — O24.410 DIET CONTROLLED GESTATIONAL DIABETES MELLITUS (GDM) IN SECOND TRIMESTER: ICD-10-CM

## 2024-10-04 LAB
ABO + RH BLD: NORMAL
ALBUMIN SERPL BCP-MCNC: 2.6 G/DL (ref 3.5–5.2)
ALP SERPL-CCNC: 149 U/L (ref 55–135)
ALT SERPL W/O P-5'-P-CCNC: 17 U/L (ref 10–44)
ANION GAP SERPL CALC-SCNC: 10 MMOL/L (ref 8–16)
AST SERPL-CCNC: 12 U/L (ref 10–40)
BACTERIA #/AREA URNS HPF: ABNORMAL /HPF
BASOPHILS # BLD AUTO: 0.02 K/UL (ref 0–0.2)
BASOPHILS NFR BLD: 0.3 % (ref 0–1.9)
BILIRUB SERPL-MCNC: 0.3 MG/DL (ref 0.1–1)
BILIRUB UR QL STRIP: NEGATIVE
BLD GP AB SCN CELLS X3 SERPL QL: NORMAL
BUN SERPL-MCNC: 6 MG/DL (ref 6–20)
CALCIUM SERPL-MCNC: 9.3 MG/DL (ref 8.7–10.5)
CAOX CRY URNS QL MICRO: ABNORMAL
CHLORIDE SERPL-SCNC: 110 MMOL/L (ref 95–110)
CLARITY UR: ABNORMAL
CO2 SERPL-SCNC: 18 MMOL/L (ref 23–29)
COLOR UR: YELLOW
CREAT SERPL-MCNC: 0.6 MG/DL (ref 0.5–1.4)
CREAT UR-MCNC: 186.1 MG/DL (ref 15–325)
DIFFERENTIAL METHOD BLD: ABNORMAL
EOSINOPHIL # BLD AUTO: 0.1 K/UL (ref 0–0.5)
EOSINOPHIL NFR BLD: 1.2 % (ref 0–8)
ERYTHROCYTE [DISTWIDTH] IN BLOOD BY AUTOMATED COUNT: 12.6 % (ref 11.5–14.5)
EST. GFR  (NO RACE VARIABLE): >60 ML/MIN/1.73 M^2
ESTIMATED AVG GLUCOSE: 148 MG/DL (ref 68–131)
GLUCOSE SERPL-MCNC: 145 MG/DL (ref 70–110)
GLUCOSE SERPL-MCNC: 145 MG/DL (ref 70–110)
GLUCOSE SERPL-MCNC: 156 MG/DL (ref 70–110)
GLUCOSE SERPL-MCNC: 156 MG/DL (ref 70–110)
GLUCOSE SERPL-MCNC: 189 MG/DL (ref 70–110)
GLUCOSE UR QL STRIP: ABNORMAL
HBA1C MFR BLD: 6.8 % (ref 4–5.6)
HCT VFR BLD AUTO: 36.8 % (ref 37–48.5)
HGB BLD-MCNC: 12.7 G/DL (ref 12–16)
HGB UR QL STRIP: NEGATIVE
HIV1+2 IGG SERPL QL IA.RAPID: NORMAL
HYALINE CASTS #/AREA URNS LPF: 0 /LPF
IMM GRANULOCYTES # BLD AUTO: 0.03 K/UL (ref 0–0.04)
IMM GRANULOCYTES NFR BLD AUTO: 0.4 % (ref 0–0.5)
KETONES UR QL STRIP: ABNORMAL
LEUKOCYTE ESTERASE UR QL STRIP: NEGATIVE
LYMPHOCYTES # BLD AUTO: 2.2 K/UL (ref 1–4.8)
LYMPHOCYTES NFR BLD: 28.5 % (ref 18–48)
MCH RBC QN AUTO: 28 PG (ref 27–31)
MCHC RBC AUTO-ENTMCNC: 34.5 G/DL (ref 32–36)
MCV RBC AUTO: 81 FL (ref 82–98)
MICROSCOPIC COMMENT: ABNORMAL
MONOCYTES # BLD AUTO: 0.5 K/UL (ref 0.3–1)
MONOCYTES NFR BLD: 6.3 % (ref 4–15)
NEUTROPHILS # BLD AUTO: 4.9 K/UL (ref 1.8–7.7)
NEUTROPHILS NFR BLD: 63.3 % (ref 38–73)
NITRITE UR QL STRIP: NEGATIVE
NRBC BLD-RTO: 0 /100 WBC
PH UR STRIP: 6 [PH] (ref 5–8)
PLATELET # BLD AUTO: 298 K/UL (ref 150–450)
PMV BLD AUTO: 10.7 FL (ref 9.2–12.9)
POCT GLUCOSE: 174 MG/DL (ref 70–110)
POTASSIUM SERPL-SCNC: 3.8 MMOL/L (ref 3.5–5.1)
PROT SERPL-MCNC: 6.3 G/DL (ref 6–8.4)
PROT UR QL STRIP: ABNORMAL
PROT UR-MCNC: 18 MG/DL (ref 0–15)
PROT/CREAT UR: 0.1 MG/G{CREAT} (ref 0–0.2)
RBC # BLD AUTO: 4.54 M/UL (ref 4–5.4)
RBC #/AREA URNS HPF: 5 /HPF (ref 0–4)
RH BLD: NORMAL
SODIUM SERPL-SCNC: 138 MMOL/L (ref 136–145)
SP GR UR STRIP: >1.03 (ref 1–1.03)
SPECIMEN OUTDATE: NORMAL
SQUAMOUS #/AREA URNS HPF: 15 /HPF
TREPONEMA PALLIDUM IGG+IGM AB [PRESENCE] IN SERUM OR PLASMA BY IMMUNOASSAY: NONREACTIVE
UNIDENT CRYS URNS QL MICRO: ABNORMAL
URN SPEC COLLECT METH UR: ABNORMAL
UROBILINOGEN UR STRIP-ACNC: ABNORMAL EU/DL
WBC # BLD AUTO: 7.78 K/UL (ref 3.9–12.7)
WBC #/AREA URNS HPF: 5 /HPF (ref 0–5)
YEAST URNS QL MICRO: ABNORMAL

## 2024-10-04 PROCEDURE — 99222 1ST HOSP IP/OBS MODERATE 55: CPT | Mod: 25,,, | Performed by: OBSTETRICS & GYNECOLOGY

## 2024-10-04 PROCEDURE — 81000 URINALYSIS NONAUTO W/SCOPE: CPT

## 2024-10-04 PROCEDURE — 86901 BLOOD TYPING SEROLOGIC RH(D): CPT | Mod: 91

## 2024-10-04 PROCEDURE — 11000001 HC ACUTE MED/SURG PRIVATE ROOM

## 2024-10-04 PROCEDURE — 86901 BLOOD TYPING SEROLOGIC RH(D): CPT

## 2024-10-04 PROCEDURE — 82570 ASSAY OF URINE CREATININE: CPT

## 2024-10-04 PROCEDURE — 83036 HEMOGLOBIN GLYCOSYLATED A1C: CPT

## 2024-10-04 PROCEDURE — 25000003 PHARM REV CODE 250

## 2024-10-04 PROCEDURE — 59025 FETAL NON-STRESS TEST: CPT | Mod: 26,77,, | Performed by: OBSTETRICS & GYNECOLOGY

## 2024-10-04 PROCEDURE — 86703 HIV-1/HIV-2 1 RESULT ANTBDY: CPT | Performed by: STUDENT IN AN ORGANIZED HEALTH CARE EDUCATION/TRAINING PROGRAM

## 2024-10-04 PROCEDURE — 59025 FETAL NON-STRESS TEST: CPT | Mod: 26,,, | Performed by: STUDENT IN AN ORGANIZED HEALTH CARE EDUCATION/TRAINING PROGRAM

## 2024-10-04 PROCEDURE — 85025 COMPLETE CBC W/AUTO DIFF WBC: CPT

## 2024-10-04 PROCEDURE — 86850 RBC ANTIBODY SCREEN: CPT

## 2024-10-04 PROCEDURE — 99284 EMERGENCY DEPT VISIT MOD MDM: CPT | Mod: ,,, | Performed by: STUDENT IN AN ORGANIZED HEALTH CARE EDUCATION/TRAINING PROGRAM

## 2024-10-04 PROCEDURE — 63600175 PHARM REV CODE 636 W HCPCS

## 2024-10-04 PROCEDURE — 4A1HXCZ MONITORING OF PRODUCTS OF CONCEPTION, CARDIAC RATE, EXTERNAL APPROACH: ICD-10-PCS | Performed by: OBSTETRICS & GYNECOLOGY

## 2024-10-04 PROCEDURE — 86593 SYPHILIS TEST NON-TREP QUANT: CPT | Performed by: STUDENT IN AN ORGANIZED HEALTH CARE EDUCATION/TRAINING PROGRAM

## 2024-10-04 PROCEDURE — 80053 COMPREHEN METABOLIC PANEL: CPT

## 2024-10-04 PROCEDURE — 86900 BLOOD TYPING SEROLOGIC ABO: CPT

## 2024-10-04 PROCEDURE — 36415 COLL VENOUS BLD VENIPUNCTURE: CPT

## 2024-10-04 RX ORDER — FAMOTIDINE 20 MG/1
20 TABLET, FILM COATED ORAL 2 TIMES DAILY
Status: DISCONTINUED | OUTPATIENT
Start: 2024-10-05 | End: 2024-10-06 | Stop reason: HOSPADM

## 2024-10-04 RX ORDER — CALCIUM CARBONATE 200(500)MG
500 TABLET,CHEWABLE ORAL ONCE
Status: DISCONTINUED | OUTPATIENT
Start: 2024-10-04 | End: 2024-10-05

## 2024-10-04 RX ORDER — DIPHENHYDRAMINE HYDROCHLORIDE 50 MG/ML
25 INJECTION INTRAMUSCULAR; INTRAVENOUS EVERY 4 HOURS PRN
Status: DISCONTINUED | OUTPATIENT
Start: 2024-10-04 | End: 2024-10-06 | Stop reason: HOSPADM

## 2024-10-04 RX ORDER — ONDANSETRON 8 MG/1
8 TABLET, ORALLY DISINTEGRATING ORAL EVERY 8 HOURS PRN
Status: DISCONTINUED | OUTPATIENT
Start: 2024-10-04 | End: 2024-10-06 | Stop reason: HOSPADM

## 2024-10-04 RX ORDER — IBUPROFEN 200 MG
24 TABLET ORAL
Status: DISCONTINUED | OUTPATIENT
Start: 2024-10-04 | End: 2024-10-06 | Stop reason: HOSPADM

## 2024-10-04 RX ORDER — ACETAMINOPHEN 325 MG/1
650 TABLET ORAL EVERY 6 HOURS PRN
Status: DISCONTINUED | OUTPATIENT
Start: 2024-10-04 | End: 2024-10-06 | Stop reason: HOSPADM

## 2024-10-04 RX ORDER — IBUPROFEN 200 MG
16 TABLET ORAL
Status: DISCONTINUED | OUTPATIENT
Start: 2024-10-04 | End: 2024-10-06 | Stop reason: HOSPADM

## 2024-10-04 RX ORDER — SODIUM CHLORIDE 0.9 % (FLUSH) 0.9 %
10 SYRINGE (ML) INJECTION
Status: DISCONTINUED | OUTPATIENT
Start: 2024-10-04 | End: 2024-10-06 | Stop reason: HOSPADM

## 2024-10-04 RX ORDER — CALCIUM CARBONATE 200(500)MG
500 TABLET,CHEWABLE ORAL 3 TIMES DAILY PRN
Status: DISCONTINUED | OUTPATIENT
Start: 2024-10-04 | End: 2024-10-06 | Stop reason: HOSPADM

## 2024-10-04 RX ORDER — INSULIN ASPART 100 [IU]/ML
0-5 INJECTION, SOLUTION INTRAVENOUS; SUBCUTANEOUS DAILY PRN
Status: DISCONTINUED | OUTPATIENT
Start: 2024-10-04 | End: 2024-10-06 | Stop reason: HOSPADM

## 2024-10-04 RX ORDER — AMOXICILLIN 250 MG
1 CAPSULE ORAL NIGHTLY PRN
Status: DISCONTINUED | OUTPATIENT
Start: 2024-10-04 | End: 2024-10-06 | Stop reason: HOSPADM

## 2024-10-04 RX ORDER — DIPHENHYDRAMINE HCL 25 MG
25 CAPSULE ORAL ONCE
Status: COMPLETED | OUTPATIENT
Start: 2024-10-04 | End: 2024-10-04

## 2024-10-04 RX ORDER — METOCLOPRAMIDE 10 MG/1
10 TABLET ORAL ONCE
Status: COMPLETED | OUTPATIENT
Start: 2024-10-04 | End: 2024-10-04

## 2024-10-04 RX ORDER — SIMETHICONE 80 MG
1 TABLET,CHEWABLE ORAL EVERY 6 HOURS PRN
Status: DISCONTINUED | OUTPATIENT
Start: 2024-10-04 | End: 2024-10-06 | Stop reason: HOSPADM

## 2024-10-04 RX ORDER — INSULIN ASPART 100 [IU]/ML
6 INJECTION, SOLUTION INTRAVENOUS; SUBCUTANEOUS 2 TIMES DAILY WITH MEALS
Status: DISCONTINUED | OUTPATIENT
Start: 2024-10-05 | End: 2024-10-06

## 2024-10-04 RX ORDER — PRENATAL WITH FERROUS FUM AND FOLIC ACID 3080; 920; 120; 400; 22; 1.84; 3; 20; 10; 1; 12; 200; 27; 25; 2 [IU]/1; [IU]/1; MG/1; [IU]/1; MG/1; MG/1; MG/1; MG/1; MG/1; MG/1; UG/1; MG/1; MG/1; MG/1; MG/1
1 TABLET ORAL DAILY
Status: DISCONTINUED | OUTPATIENT
Start: 2024-10-05 | End: 2024-10-06 | Stop reason: HOSPADM

## 2024-10-04 RX ORDER — FAMOTIDINE 20 MG/1
20 TABLET, FILM COATED ORAL ONCE
Status: DISCONTINUED | OUTPATIENT
Start: 2024-10-04 | End: 2024-10-05

## 2024-10-04 RX ORDER — DIPHENHYDRAMINE HCL 25 MG
25 CAPSULE ORAL EVERY 4 HOURS PRN
Status: DISCONTINUED | OUTPATIENT
Start: 2024-10-04 | End: 2024-10-06 | Stop reason: HOSPADM

## 2024-10-04 RX ORDER — GLUCAGON 1 MG
1 KIT INJECTION
Status: DISCONTINUED | OUTPATIENT
Start: 2024-10-04 | End: 2024-10-06 | Stop reason: HOSPADM

## 2024-10-04 RX ADMIN — METOCLOPRAMIDE 10 MG: 10 TABLET ORAL at 11:10

## 2024-10-04 RX ADMIN — INSULIN ASPART 1 UNITS: 100 INJECTION, SOLUTION INTRAVENOUS; SUBCUTANEOUS at 08:10

## 2024-10-04 RX ADMIN — ACETAMINOPHEN 650 MG: 325 TABLET, FILM COATED ORAL at 08:10

## 2024-10-04 RX ADMIN — INSULIN ASPART 6 UNITS: 100 INJECTION, SOLUTION INTRAVENOUS; SUBCUTANEOUS at 06:10

## 2024-10-04 RX ADMIN — DIPHENHYDRAMINE HYDROCHLORIDE 25 MG: 25 CAPSULE ORAL at 11:10

## 2024-10-04 RX ADMIN — HUMAN INSULIN 8 UNITS: 100 INJECTION, SUSPENSION SUBCUTANEOUS at 09:10

## 2024-10-04 NOTE — PROGRESS NOTES
ALEX SANTO reviewed pt's BS log and will call pt's OB with ALEX recommendation. OB number confirmed and given to ALEX SANTO.

## 2024-10-04 NOTE — ED PROVIDER NOTES
"Encounter Date: 10/4/2024       History     Chief Complaint   Patient presents with    Hyperglycemia     Margret Krueger is a 24 y.o. F at 27w2d presents after being sent by [facility] provider for BG persistently over 200 for last week.   This IUP is complicated by GDMA2, Obesity, Mood Disorder.  Patient denies contractions, denies vaginal bleeding, denies LOF.   Fetal Movement: normal        Review of patient's allergies indicates:   Allergen Reactions    Ether for anesthesia Other (See Comments)     "The medicine when you get put to sleep for surgery. It makes me jump."     Past Medical History:   Diagnosis Date    ADHD     Bipolar 1 disorder     Borderline diabetes      Past Surgical History:   Procedure Laterality Date    ADENOIDECTOMY      CHOLECYSTECTOMY      LAPAROSCOPIC CHOLECYSTECTOMY N/A 2022    Procedure: CHOLECYSTECTOMY, LAPAROSCOPIC;  Surgeon: Niall Mcduffie MD;  Location: [facility];  Service: General;  Laterality: N/A;    TYMPANOSTOMY TUBE PLACEMENT       Family History   Problem Relation Name Age of Onset    Hypertension Maternal Grandfather      Diabetes Maternal Grandfather      Hyperlipidemia Father      Heart attacks under age 50 Mother      Hypertension Mother      Diabetes Mother      Heart attacks under age 50 Brother  6 - 7        heart murmur     Social History     Tobacco Use    Smoking status: Former     Types: Cigarettes    Smokeless tobacco: Never    Tobacco comments:     Quit 2 months after getting pregnant   Substance Use Topics    Alcohol use: Not Currently    Drug use: Not Currently     Types: Marijuana     Comment: denies use as of 2023     Review of Systems   Constitutional:  Negative for chills and fever.   HENT:  Negative for congestion.    Eyes:  Negative for visual disturbance.   Respiratory:  Negative for shortness of breath.    Cardiovascular:  Negative for chest pain and palpitations.   Gastrointestinal:  Negative for constipation, diarrhea, nausea and " vomiting.   Genitourinary:  Negative for dysuria, vaginal bleeding and vaginal discharge.   Musculoskeletal:  Negative for back pain.   Skin:  Negative for rash.   Neurological:  Negative for light-headedness and headaches.       Physical Exam     Initial Vitals   BP Pulse Resp Temp SpO2   10/04/24 1606 10/04/24 1606 10/04/24 1606 10/04/24 1606 10/04/24 1608   (!) 143/79 (!) 111 16 98 °F (36.7 °C) 98 %      MAP       --                Physical Exam    Vitals reviewed.  Constitutional: She appears well-developed and well-nourished. She is not diaphoretic.   HENT:   Head: Normocephalic and atraumatic.   Eyes: Conjunctivae are normal.   Cardiovascular:  Normal rate.           Abdominal: There is no abdominal tenderness. There is no rebound and no guarding.     Neurological: She is alert and oriented to person, place, and time.   Skin: Skin is warm and dry.   Psychiatric: She has a normal mood and affect.         ED Course   Fetal non-stress test    Date/Time: 10/4/2024 6:12 PM    Performed by: Farida Walker MD  Authorized by: Mckenzie Drake MD    Nonstress Test:     Variability:  6-25 BPM    Decelerations:  None    Accelerations:  10 bpm    Baseline:  145    Contractions:  Not present  Biophysical Profile:     Nonstress Test Interpretation: reactive      Overall Impression:  Reassuring  Post-procedure:     Patient tolerance:  Patient tolerated the procedure well with no immediate complications    Labs Reviewed   POCT GLUCOSE - Abnormal       Result Value    POCT Glucose 174 (*)           Imaging Results    None          Medications   famotidine tablet 20 mg (has no administration in time range)   calcium carbonate 200 mg calcium (500 mg) chewable tablet 500 mg (has no administration in time range)   sodium chloride 0.9% flush 10 mL (has no administration in time range)   acetaminophen tablet 650 mg (has no administration in time range)   ondansetron disintegrating tablet 8 mg (has no administration in time  range)   senna-docusate 8.6-50 mg per tablet 1 tablet (has no administration in time range)   simethicone chewable tablet 80 mg (has no administration in time range)   diphenhydrAMINE capsule 25 mg (has no administration in time range)   diphenhydrAMINE injection 25 mg (has no administration in time range)   prenatal vitamin oral tablet (has no administration in time range)   insulin NPH injection 8 Units (has no administration in time range)   insulin NPH injection 12 Units (has no administration in time range)   insulin aspart U-100 pen 6 Units (6 Units Subcutaneous Given 10/4/24 1825)   glucose chewable tablet 16 g (has no administration in time range)   glucose chewable tablet 24 g (has no administration in time range)   dextrose 10% bolus 125 mL 125 mL (has no administration in time range)   dextrose 10% bolus 250 mL 250 mL (has no administration in time range)   glucagon (human recombinant) injection 1 mg (has no administration in time range)   insulin aspart U-100 pen 0-5 Units (has no administration in time range)   famotidine tablet 20 mg (has no administration in time range)   calcium carbonate 200 mg calcium (500 mg) chewable tablet 500 mg (has no administration in time range)     Medical Decision Making  - VSS, mild range BP  - NST appropriate for gestation age   - POCT glucose 174  - CBC, CMP, PCr and third trimester labs ordered   - Admit patient to antepartum. Sent from [facility] clinic for admission/blood sugar patterning.    - See H&P for further details     Amount and/or Complexity of Data Reviewed  Labs: ordered.    Risk  OTC drugs.  Prescription drug management.  Decision regarding hospitalization.              Attending Attestation:   Physician Attestation Statement for Resident:  As the supervising MD   Physician Attestation Statement: I have personally seen and examined this patient.   I agree with the above history.  -:   As the supervising MD I agree with the above PE.     As the supervising MD I  agree with the above treatment, course, plan, and disposition.   I was personally present during the critical portions of the procedure(s) performed by the resident and was immediately available in the ED to provide services and assistance as needed during the entire procedure.  I have reviewed and agree with the residents interpretation of the following: lab data.  I have reviewed the following: old records at this facility.                                       Clinical Impression:  Final diagnoses:  [O24.419] Gestational diabetes mellitus (GDM) in second trimester (Primary)  [Z3A.27] 27 weeks gestation of pregnancy          ED Disposition Condition    Admit Stable                Farida Walker MD  Resident  10/04/24 1820       Mckenzie Drake MD  10/04/24 0550

## 2024-10-04 NOTE — H&P
[facility] - Labor & Delivery  Obstetrics  History & Physical    Patient Name: Margret Krueger  MRN: [id number]  Admission Date: 10/4/2024  Primary Care Provider: [facility]    Subjective:     Principal Problem:Gestational diabetes mellitus (GDM) in second trimester    History of Present Illness: Margret Krueger is a 24 y.o.  at 27w2d, pregnancy complicated by GDMA2, who presented to the [facility] after provider reviewed patients blood glucose log and found BS to be persistently over 200s. On presentation patient had no complaints. She had been taking NPH 4u AM/ 4u PM at home.     Obstetric HPI:  Patient reports no contractions, active fetal movement, No vaginal bleeding , No loss of fluid     This pregnancy has been complicated by GDMA2, Obesity (prepreg BMI 49), and Mood disorder.     OB History    Para Term  AB Living   1 0 0 0 0 0   SAB IAB Ectopic Multiple Live Births   0 0 0 0 0      # Outcome Date GA Lbr Aquiles/2nd Weight Sex Type Anes PTL Lv   1 Current              Past Medical History:   Diagnosis Date    ADHD     Bipolar 1 disorder     Borderline diabetes      Past Surgical History:   Procedure Laterality Date    ADENOIDECTOMY      CHOLECYSTECTOMY      LAPAROSCOPIC CHOLECYSTECTOMY N/A 2022    Procedure: CHOLECYSTECTOMY, LAPAROSCOPIC;  Surgeon: Niall Mcduffie MD;  Location: [facility];  Service: General;  Laterality: N/A;    TYMPANOSTOMY TUBE PLACEMENT         PTA Medications   Medication Sig    acyclovir (ZOVIRAX) 400 MG tablet Take 1 tablet (400 mg total) by mouth 5 (five) times daily. for 5 days    amoxicillin-clavulanate 875-125mg (AUGMENTIN) 875-125 mg per tablet Take 1 tablet by mouth 2 (two) times daily. (Patient not taking: Reported on 7/15/2024)    blood sugar diagnostic Strp Patient to test first thing in morning and 2 hours after breakfast, lunch, and dinner    cholestyramine, with sugar, 4 gram Powd Take 4 g by mouth once daily.     "dextroamphetamine-amphetamine (ADDERALL XR) 30 MG 24 hr capsule Take 30 mg by mouth every morning. (Patient not taking: Reported on 8/1/2024)    HUMULIN N NPH INSULIN KWIKPEN 100 unit/mL (3 mL) InPn Inject 4 Units into the skin 2 (two) times a day.    insulin NPH isoph U-100 human 100 unit/mL (3 mL) InPn Inject 4 Units into the skin 2 (two) times a day.    insulin syringe-needle U-100 0.3 mL 30 gauge x 5/16" Syrg Use insulin needles with insulin as prescribed    insulin syringe-needle U-100 0.3 mL 30 gauge x 5/16" Syrg Use insulin needles with insulin as prescribed    insulin syringe-needle U-100 0.3 mL 30 gauge x 5/16" Syrg Use insulin needles with insulin as prescribed    naproxen sodium (ANAPROX) 550 MG tablet Take 1 tablet (550 mg total) by mouth 2 (two) times daily with meals. (Patient not taking: Reported on 8/27/2024)    nystatin (MYCOSTATIN) powder Apply topically 2 (two) times daily. To area under breasts (Patient not taking: Reported on 8/1/2024)    pen needle, diabetic (BD ULTRA-FINE KAYLIE PEN NEEDLE) 32 gauge x 5/32" Ndle use twice daily.    pen needle, diabetic 32 gauge x 5/32" Ndle 4 Units by Misc.(Non-Drug; Combo Route) route nightly.    prenatal vit/iron fum/folic ac (PRENATAL 1+1 ORAL) Take by mouth.    sertraline (ZOLOFT) 25 MG tablet Take 25 mg by mouth once daily.       Review of patient's allergies indicates:   Allergen Reactions    Ether for anesthesia Other (See Comments)     "The medicine when you get put to sleep for surgery. It makes me jump."        Family History       Problem Relation (Age of Onset)    Diabetes Maternal Grandfather, Mother    Heart attacks under age 50 Mother, Brother (6 - 7)    Hyperlipidemia Father    Hypertension Maternal Grandfather, Mother          Tobacco Use    Smoking status: Former     Types: Cigarettes    Smokeless tobacco: Never    Tobacco comments:     Quit 2 months after getting pregnant   Substance and Sexual Activity    Alcohol use: Not Currently    Drug " use: Not Currently     Types: Marijuana     Comment: denies use as of 4/12/2023    Sexual activity: Yes     Partners: Male     Review of Systems   Constitutional:  Negative for activity change, chills and fever.   HENT:  Negative for nasal congestion.    Eyes:  Negative for visual disturbance.   Cardiovascular:  Negative for chest pain.   Gastrointestinal:  Negative for nausea and vomiting.   Genitourinary:  Negative for dysuria, genital sores and vaginal bleeding.   Musculoskeletal:  Negative for back pain.   Integumentary:  Negative for hair changes.   Neurological:  Negative for headaches.      Objective:     Vital Signs (Most Recent):  Temp: 98 °F (36.7 °C) (10/04/24 1606)  Pulse: 107 (10/04/24 1608)  Resp: 16 (10/04/24 1606)  BP: (!) 143/79 (10/04/24 1606)  SpO2: 98 % (10/04/24 1608) Vital Signs (24h Range):  Temp:  [98 °F (36.7 °C)] 98 °F (36.7 °C)  Pulse:  [107-111] 107  Resp:  [16] 16  SpO2:  [98 %] 98 %  BP: (143)/(79) 143/79        There is no height or weight on file to calculate BMI.    FHT: 145 bpm baseline, + accels, - decels (reassuring)  TOCO:  No contractions    Physical Exam:   Constitutional: She is oriented to person, place, and time. She appears well-developed and well-nourished. No distress.        Pulmonary/Chest: Effort normal.        Abdominal: Soft. There is no abdominal tenderness. There is no rebound and no guarding.             Musculoskeletal: Normal range of motion.       Neurological: She is alert and oriented to person, place, and time.    Skin: Skin is warm and dry. She is not diaphoretic.    Psychiatric: She has a normal mood and affect.       Cervix: Deferred   Presentation: Vertex     Significant Labs:  Recent Labs   Lab 10/04/24  1637   WBC 7.78   RBC 4.54   HGB 12.7   HCT 36.8*      MCV 81*   MCH 28.0   MCHC 34.5     Recent Labs   Lab 10/04/24  1637      K 3.8      CO2 18*   BUN 6   CREATININE 0.6   *   PROT 6.3   BILITOT 0.3   ALKPHOS 149*   ALT 17    AST 12        Assessment/Plan:     24 y.o. female  at 27w2d for:    Active Diagnoses:    Diagnosis Date Noted POA    PRINCIPAL PROBLEM:  Gestational diabetes mellitus (GDM) in second trimester [O24.419] 2024 Yes      Problems Resolved During this Admission:       1) GDMA2  - Diagnosed on early 1hr gtt, confirmed on 3 hr  - Current regimen: NPH 4 units AM/PM  - Over last week  - 300  - On admit POCT glucose 176    Plan:  - POCT Glucose Fasting, post 2 hr, QHS  - A1c pending   - New regimen: NPH 12 u AM/ 8 u PM. Aspart 6u w/ breakfast, 6u w/ dinner  - Will use patient continuous monitor and calibrate daily     2) Mild range BP  - Asymptomatic   - PreE labs pending   - Continue to monitor    3) 27 weeks GA  - Primary OB: [facility]   - Delivery consents, including vaginal, operative, and , and blood consents reviewed and signed at time of admission.   - Presentation: vertex; If moving forward with delivery, will rescan to determine fetal pesentation   - Growth Ultrasound: 26w0d EFW at the 45% and the AC at the 31%. The EFW is 870 g.   - Diet: Diabetic   - Monitoring: NST/TOCO BID  - Continue PNV    4) Mood disorder  - No home meds    5) Obesity   - Prepreg BMI 49 BMI  - Will need chemical VTE prophylaxis pp    Farida Walker MD  Obstetrics  [facility] - Labor & Delivery

## 2024-10-04 NOTE — TELEPHONE ENCOUNTER
Pt called concerned about fasting BS being elevated even with taking her insulin. Pt confirmed she is taking her insulin as prescribed. Asked pt to send in her BS from the day she started her insulin to today. Will have a nurse or doctor review and advise pt of any changes.

## 2024-10-04 NOTE — PROGRESS NOTES
Reviewed Patient's blood sugar log as below:            Current regimen: NPH 4u BID     Patient currently being seen at Dr. Aceves's office. RN Pauline Doshi reached out regarding this patient's very high FSBG for MFM review and recommendation regarding outpatient vs inpatient management. As her sugars are virtually all above 200, and many are above 250, I recommend admission and inpatient titration. I spoke with Dr. Aceves to relay my recommendation. Dr. Aceves instructed the patient to present to [facility] today for admission and the patient was amenable. Dr. Soto (MFM Attending) notified of patient's planned arrival.      Nika Love PGY5  Maternal Fetal Medicine Fellow;

## 2024-10-05 LAB
GLUCOSE SERPL-MCNC: 104 MG/DL (ref 70–110)
GLUCOSE SERPL-MCNC: 140 MG/DL (ref 70–110)
GLUCOSE SERPL-MCNC: 147 MG/DL (ref 70–110)
GLUCOSE SERPL-MCNC: 208 MG/DL (ref 70–110)
GLUCOSE SERPL-MCNC: 211 MG/DL (ref 70–110)
POCT GLUCOSE: 226 MG/DL (ref 70–110)

## 2024-10-05 PROCEDURE — 63600175 PHARM REV CODE 636 W HCPCS

## 2024-10-05 PROCEDURE — 25000003 PHARM REV CODE 250

## 2024-10-05 PROCEDURE — 11000001 HC ACUTE MED/SURG PRIVATE ROOM

## 2024-10-05 RX ORDER — INSULIN ASPART 100 [IU]/ML
1 INJECTION, SOLUTION INTRAVENOUS; SUBCUTANEOUS ONCE
Status: COMPLETED | OUTPATIENT
Start: 2024-10-05 | End: 2024-10-05

## 2024-10-05 RX ADMIN — INSULIN ASPART 6 UNITS: 100 INJECTION, SOLUTION INTRAVENOUS; SUBCUTANEOUS at 08:10

## 2024-10-05 RX ADMIN — FAMOTIDINE 20 MG: 20 TABLET ORAL at 09:10

## 2024-10-05 RX ADMIN — HUMAN INSULIN 12 UNITS: 100 INJECTION, SUSPENSION SUBCUTANEOUS at 08:10

## 2024-10-05 RX ADMIN — PRENATAL VIT W/ FE FUMARATE-FA TAB 27-0.8 MG 1 TABLET: 27-0.8 TAB at 08:10

## 2024-10-05 RX ADMIN — INSULIN ASPART 6 UNITS: 100 INJECTION, SOLUTION INTRAVENOUS; SUBCUTANEOUS at 05:10

## 2024-10-05 RX ADMIN — FAMOTIDINE 20 MG: 20 TABLET ORAL at 08:10

## 2024-10-05 RX ADMIN — INSULIN ASPART 2 UNITS: 100 INJECTION, SOLUTION INTRAVENOUS; SUBCUTANEOUS at 10:10

## 2024-10-05 RX ADMIN — HUMAN INSULIN 12 UNITS: 100 INJECTION, SUSPENSION SUBCUTANEOUS at 09:10

## 2024-10-05 RX ADMIN — INSULIN ASPART 1 UNITS: 100 INJECTION, SOLUTION INTRAVENOUS; SUBCUTANEOUS at 05:10

## 2024-10-05 NOTE — CARE UPDATE
AM NST    FHT: 150 bpm, moderate variability, +accels, - decels; (reassuring)  [place]: no contractions    Monitoring reassuring  Continue NST BID    Odette Hawkins MD  OB/GYN PGY-2

## 2024-10-05 NOTE — PLAN OF CARE
Problem: Diabetes in Pregnancy  Goal: Optimal Coping  Outcome: Progressing  Goal: Blood Glucose Level Within Targeted Range  Outcome: Progressing     Problem:  Fall Injury Risk  Goal: Absence of Fall, Infant Drop and Related Injury  Outcome: Progressing     Problem: Adult Inpatient Plan of Care  Goal: Plan of Care Review  Outcome: Progressing  Goal: Patient-Specific Goal (Individualized)  Outcome: Progressing  Goal: Absence of Hospital-Acquired Illness or Injury  Outcome: Progressing  Goal: Optimal Comfort and Wellbeing  Outcome: Progressing  Goal: Readiness for Transition of Care  Outcome: Progressing     Problem: Bariatric Environmental Safety  Goal: Safety Maintained with Care  Outcome: Progressing     Problem: Comorbidity Management  Goal: Blood Pressure in Desired Range  Outcome: Progressing

## 2024-10-05 NOTE — CARE UPDATE
PM NST    FHT: 140 bpm, moderate variability, +accels, - decels; (reassuring)  [place]: no contractions    Monitoring reassuring  Continue NST BID    Farida Givens MD  Obstetrics and Gynecology, PGY-2

## 2024-10-05 NOTE — PLAN OF CARE
Problem: Diabetes in Pregnancy  Goal: Optimal Coping  Outcome: Progressing     Problem: Diabetes in Pregnancy  Goal: Blood Glucose Level Within Targeted Range  Outcome: Progressing

## 2024-10-05 NOTE — NURSING
POCT handheld device results that were uploaded in laboratory section and ordered at 08/04/2024 are wrong entries and supposed to be dated at 10/04/2024. Both entries have same POCT results and result date. Notified MD.    0600H- Fasting blood sugar done and obtained 226mg/dL while in CGM dexcom 211mg/dL. MD notified

## 2024-10-05 NOTE — PROGRESS NOTES
"PROGRESS NOTE  ANTEPARTUM    Admit Date: 10/4/2024   LOS: 1 day     Reason for Admission:  Gestational diabetes mellitus (GDM) in second trimester    SUBJECTIVE:     Margret Krueger is a 24 y.o. female at 27w2d who is here for blood sugar patterning and titration of insulin regimen.    Patient reports no contractions, active fetal movement, No vaginal bleeding , No loss of fluid. Denies F/C, N/V.    Scheduled Meds:   calcium carbonate  500 mg Oral Once    famotidine  20 mg Oral Once    famotidine  20 mg Oral BID    insulin aspart U-100  1 Units Subcutaneous Once    insulin aspart U-100  6 Units Subcutaneous BIDWM    insulin NPH  12 Units Subcutaneous Daily    insulin NPH  8 Units Subcutaneous QHS    prenatal vitamin  1 tablet Oral Daily     Continuous Infusions:  PRN Meds:  Current Facility-Administered Medications:     acetaminophen, 650 mg, Oral, Q6H PRN    calcium carbonate, 500 mg, Oral, TID PRN    dextrose 10%, 12.5 g, Intravenous, PRN    dextrose 10%, 25 g, Intravenous, PRN    diphenhydrAMINE, 25 mg, Oral, Q4H PRN    diphenhydrAMINE, 25 mg, Intravenous, Q4H PRN    glucagon (human recombinant), 1 mg, Intramuscular, PRN    glucose, 16 g, Oral, PRN    glucose, 24 g, Oral, PRN    insulin aspart U-100, 0-5 Units, Subcutaneous, Daily PRN    ondansetron, 8 mg, Oral, Q8H PRN    senna-docusate 8.6-50 mg, 1 tablet, Oral, Nightly PRN    simethicone, 1 tablet, Oral, Q6H PRN    sodium chloride 0.9%, 10 mL, Intravenous, PRN    Review of patient's allergies indicates:   Allergen Reactions    Ether for anesthesia Other (See Comments)     "The medicine when you get put to sleep for surgery. It makes me jump."       OBJECTIVE:     Vital Signs (Most Recent)  Temp: 98.3 °F (36.8 °C) (10/05/24 0423)  Pulse: 89 (10/05/24 0423)  Resp: 18 (10/05/24 0423)  BP: (!) 99/55 (10/05/24 0423)  SpO2: 95 % (10/05/24 0423)    Temperature Range Min/Max (Last 24H):  Temp:  [98 °F (36.7 °C)-98.4 °F (36.9 °C)]     Vital Signs Range (Last " 24H):  Temp:  [98 °F (36.7 °C)-98.4 °F (36.9 °C)]   Pulse:  []   Resp:  [16-20]   BP: ()/(55-79)   SpO2:  [95 %-99 %]     I & O (Last 24H):  Intake/Output Summary (Last 24 hours) at 10/5/2024 0507  Last data filed at 10/5/2024 0410  Gross per 24 hour   Intake 500 ml   Output 700 ml   Net -200 ml       Physical Exam:  General: well developed, well nourished  Lungs:  normal respiratory effort  Abdomen: normal findings: soft, non-tender  Pelvic:  Exam deferred.    PM NST appropriate for GA, AM NST to follow.    Laboratory:  Blood glucose:   QHS: 145   Fastin    ASSESSMENT/PLAN:     Active Hospital Problems    Diagnosis  POA    *Gestational diabetes mellitus (GDM) in second trimester [O24.419]  Yes     BMI 54 and was prediabetic prior to pregnancy  Diagnosed on early 1 hr GTT with confirmatory 3 hr  Missed her initial diabetic education visit        Resolved Hospital Problems   No resolved problems to display.       Assessment:   24 y.o.female  at 27w2d HD#2 for blood sugar patterning and insulin regimen titration.     1) GDMA2  - POCT Glucose Fasting, post 2 hr, QHS  - A1c pending   - Current regimen: NPH 12 u AM/ 8 u PM. Aspart 6u w/ breakfast, 6u w/ dinner  - Will use patient continuous monitor and calibrate daily  - Continue to titrate: goal BG < 120 postprandial, < 90 fasting    - Continue diabetic diet, NST BID    2) Mild range BP  - PreE labs wnl  - Continue to monitor     3) Mood disorder  - No home meds     4) Obesity   - Prepreg BMI 49 BMI  - Will need chemical VTE prophylaxis pp    Farida Givens MD  Obstetrics and Gynecology, PGY-2

## 2024-10-06 ENCOUNTER — PATIENT MESSAGE (OUTPATIENT)
Dept: MATERNAL FETAL MEDICINE | Facility: CLINIC | Age: 24
End: 2024-10-06
Payer: MEDICAID

## 2024-10-06 VITALS
BODY MASS INDEX: 54.59 KG/M2 | HEIGHT: 55 IN | WEIGHT: 235.88 LBS | DIASTOLIC BLOOD PRESSURE: 79 MMHG | OXYGEN SATURATION: 100 % | RESPIRATION RATE: 16 BRPM | SYSTOLIC BLOOD PRESSURE: 132 MMHG | HEART RATE: 101 BPM | TEMPERATURE: 99 F

## 2024-10-06 LAB
GLUCOSE SERPL-MCNC: 130 MG/DL (ref 70–110)
GLUCOSE SERPL-MCNC: 157 MG/DL (ref 70–110)
GLUCOSE SERPL-MCNC: 97 MG/DL (ref 70–110)
POCT GLUCOSE: 138 MG/DL (ref 70–110)

## 2024-10-06 PROCEDURE — 63600175 PHARM REV CODE 636 W HCPCS

## 2024-10-06 PROCEDURE — 99238 HOSP IP/OBS DSCHRG MGMT 30/<: CPT | Mod: 25,,, | Performed by: OBSTETRICS & GYNECOLOGY

## 2024-10-06 PROCEDURE — 59025 FETAL NON-STRESS TEST: CPT | Mod: 26,,, | Performed by: OBSTETRICS & GYNECOLOGY

## 2024-10-06 PROCEDURE — 25000003 PHARM REV CODE 250

## 2024-10-06 RX ORDER — FAMOTIDINE 20 MG/1
20 TABLET, FILM COATED ORAL 2 TIMES DAILY
Qty: 60 TABLET | Refills: 11 | Status: SHIPPED | OUTPATIENT
Start: 2024-10-06 | End: 2025-10-06

## 2024-10-06 RX ORDER — METOCLOPRAMIDE 10 MG/1
10 TABLET ORAL ONCE
Status: COMPLETED | OUTPATIENT
Start: 2024-10-06 | End: 2024-10-06

## 2024-10-06 RX ORDER — INSULIN ASPART 100 [IU]/ML
INJECTION, SOLUTION INTRAVENOUS; SUBCUTANEOUS
Qty: 6 ML | Refills: 0 | Status: SHIPPED | OUTPATIENT
Start: 2024-10-06 | End: 2026-10-05

## 2024-10-06 RX ORDER — METOCLOPRAMIDE 10 MG/1
10 TABLET ORAL
Status: DISCONTINUED | OUTPATIENT
Start: 2024-10-06 | End: 2024-10-06

## 2024-10-06 RX ORDER — INSULIN ASPART 100 [IU]/ML
6 INJECTION, SOLUTION INTRAVENOUS; SUBCUTANEOUS
Qty: 3 ML | Refills: 0 | Status: SHIPPED | OUTPATIENT
Start: 2024-10-06 | End: 2024-10-06

## 2024-10-06 RX ORDER — INSULIN ASPART 100 [IU]/ML
8 INJECTION, SOLUTION INTRAVENOUS; SUBCUTANEOUS
Status: DISCONTINUED | OUTPATIENT
Start: 2024-10-06 | End: 2024-10-06 | Stop reason: HOSPADM

## 2024-10-06 RX ORDER — INSULIN ASPART 100 [IU]/ML
6 INJECTION, SOLUTION INTRAVENOUS; SUBCUTANEOUS
Status: DISCONTINUED | OUTPATIENT
Start: 2024-10-06 | End: 2024-10-06 | Stop reason: HOSPADM

## 2024-10-06 RX ORDER — PEN NEEDLE, DIABETIC 29 G X1/2"
NEEDLE, DISPOSABLE MISCELLANEOUS
COMMUNITY
Start: 2024-10-06

## 2024-10-06 RX ADMIN — INSULIN ASPART 1 UNITS: 100 INJECTION, SOLUTION INTRAVENOUS; SUBCUTANEOUS at 10:10

## 2024-10-06 RX ADMIN — FAMOTIDINE 20 MG: 20 TABLET ORAL at 09:10

## 2024-10-06 RX ADMIN — INSULIN ASPART 8 UNITS: 100 INJECTION, SOLUTION INTRAVENOUS; SUBCUTANEOUS at 07:10

## 2024-10-06 RX ADMIN — DIPHENHYDRAMINE HYDROCHLORIDE 25 MG: 25 CAPSULE ORAL at 12:10

## 2024-10-06 RX ADMIN — METOCLOPRAMIDE 10 MG: 10 TABLET ORAL at 12:10

## 2024-10-06 NOTE — CARE UPDATE
AM NST    FHT: 145 bpm, moderate variability, +accels, - decels; (reassuring)  [place]: no contractions    Monitoring reassuring  Continue NST BID    Farida Givens MD  Obstetrics and Gynecology, PGY-2

## 2024-10-06 NOTE — PLAN OF CARE
Problem: Diabetes in Pregnancy  Goal: Optimal Coping  Outcome: Met  Goal: Blood Glucose Level Within Targeted Range  Outcome: Met     Problem:  Fall Injury Risk  Goal: Absence of Fall, Infant Drop and Related Injury  Outcome: Met     Problem: Adult Inpatient Plan of Care  Goal: Plan of Care Review  Outcome: Met  Goal: Patient-Specific Goal (Individualized)  Outcome: Met  Goal: Absence of Hospital-Acquired Illness or Injury  Outcome: Met  Goal: Optimal Comfort and Wellbeing  Outcome: Met  Goal: Readiness for Transition of Care  Outcome: Met     Problem: Bariatric Environmental Safety  Goal: Safety Maintained with Care  Outcome: Met     Problem: Comorbidity Management  Goal: Blood Pressure in Desired Range  Outcome: Met

## 2024-10-06 NOTE — PROGRESS NOTES
"PROGRESS NOTE  ANTEPARTUM    Admit Date: 10/4/2024   LOS: 2 days     Reason for Admission:  Gestational diabetes mellitus (GDM) in second trimester    SUBJECTIVE:     Margret Krueger is a 24 y.o. female at 27w4d who is here for blood sugar patterning and titration of insulin regimen.    Patient reports no contractions, active fetal movement, No vaginal bleeding , No loss of fluid. Denies F/C, N/V and HA.    Scheduled Meds:   famotidine  20 mg Oral BID    insulin aspart U-100  6 Units Subcutaneous BIDWM    insulin NPH  12 Units Subcutaneous Daily    insulin NPH  12 Units Subcutaneous QHS    prenatal vitamin  1 tablet Oral Daily     Continuous Infusions:  PRN Meds:  Current Facility-Administered Medications:     acetaminophen, 650 mg, Oral, Q6H PRN    calcium carbonate, 500 mg, Oral, TID PRN    dextrose 10%, 12.5 g, Intravenous, PRN    dextrose 10%, 25 g, Intravenous, PRN    diphenhydrAMINE, 25 mg, Oral, Q4H PRN    diphenhydrAMINE, 25 mg, Intravenous, Q4H PRN    glucagon (human recombinant), 1 mg, Intramuscular, PRN    glucose, 16 g, Oral, PRN    glucose, 24 g, Oral, PRN    insulin aspart U-100, 0-5 Units, Subcutaneous, Daily PRN    ondansetron, 8 mg, Oral, Q8H PRN    senna-docusate 8.6-50 mg, 1 tablet, Oral, Nightly PRN    simethicone, 1 tablet, Oral, Q6H PRN    sodium chloride 0.9%, 10 mL, Intravenous, PRN    Review of patient's allergies indicates:   Allergen Reactions    Ether for anesthesia Other (See Comments)     "The medicine when you get put to sleep for surgery. It makes me jump."       OBJECTIVE:     Vital Signs (Most Recent)  Temp: 98 °F (36.7 °C) (10/06/24 0503)  Pulse: 98 (10/06/24 0503)  Resp: 18 (10/06/24 0503)  BP: (!) 105/53 (10/06/24 0503)  SpO2: 97 % (10/06/24 0503)    Temperature Range Min/Max (Last 24H):  Temp:  [98 °F (36.7 °C)-98.5 °F (36.9 °C)]     Vital Signs Range (Last 24H):  Temp:  [98 °F (36.7 °C)-98.5 °F (36.9 °C)]   Pulse:  [75-98]   Resp:  [16-20]   BP: (105-123)/(53-71)   SpO2:  " [96 %-100 %]     I & O (Last 24H):  Intake/Output Summary (Last 24 hours) at 10/6/2024 0535  Last data filed at 10/5/2024 1838  Gross per 24 hour   Intake --   Output 1300 ml   Net -1300 ml       Physical Exam:  General: well developed, well nourished  Lungs:  normal respiratory effort  Abdomen: normal findings: soft, non-tender  Pelvic:  Exam deferred.    PM NST appropriate for GA, AM NST to follow.    Laboratory:  Blood glucose:   Fastin(1u)   Post Breakfast: 208(2u)        Post Lunch: 140         Post Dinner: 104          QHS: 97    Hgb A1c 6.8    Recent Labs   Lab 10/04/24  1637   WBC 7.78   HGB 12.7   HCT 36.8*   MCV 81*          Recent Labs   Lab 10/04/24  1637      K 3.8      CO2 18*   BUN 6   CREATININE 0.6   *   PROT 6.3   BILITOT 0.3   ALKPHOS 149*   ALT 17   AST 12       P:C 0.10    ASSESSMENT/PLAN:     Active Hospital Problems    Diagnosis  POA    *Gestational diabetes mellitus (GDM) in second trimester [O24.419]  Yes     BMI 54 and was prediabetic prior to pregnancy  Diagnosed on early 1 hr GTT with confirmatory 3 hr  Missed her initial diabetic education visit        Resolved Hospital Problems   No resolved problems to display.       Assessment:   24 y.o.female  at 27w2d HD#2 for blood sugar patterning and insulin regimen titration.     1) GDMA2  - POCT Glucose Fasting, post 2 hr, QHS  - A1c 6.8  - Current regimen: NPH 12 u AM/12 u PM. Aspart 6u w/ breakfast, 6u w/ dinner  - Will use patient continuous monitor and calibrate daily  - Continue to titrate: goal BG < 120 postprandial, < 90 fasting    - Continue diabetic diet, NST BID    2) Mild range BP  - PreE labs wnl  - Continue to monitor     3) Mood disorder  - No home meds     4) Obesity   - Prepreg BMI 49 BMI  - Will need chemical VTE prophylaxis pp    Odette Hawkins MD  OB/GYN PGY-2

## 2024-10-06 NOTE — DISCHARGE SUMMARY
Discharge Summary  Obstetrics      Admit Date: 10/4/2024    Discharge Date and Time: 10/6/2024     Attending Physician: Jonna Yee MD    Principal Diagnoses: Gestational diabetes mellitus (GDM) in second trimester    Active Hospital Problems    Diagnosis  POA    *Gestational diabetes mellitus (GDM) in second trimester [O24.419]  Yes     BMI 54 and was prediabetic prior to pregnancy  Diagnosed on early 1 hr GTT with confirmatory 3 hr  Missed her initial diabetic education visit        Resolved Hospital Problems   No resolved problems to display.       Discharged Condition: stable    Hospital Course:   Margret Krueger is a 24 y.o. female at 27w4d who is here for blood sugar patterning and titration of insulin regimen. On HD 1 patient's long acting insulin was increased and a short acting insulin added to regimen. On HD2 regimen was up titrated and patient discharged on HD3 on a regimen on NPH 12 units in AM and 14 in PM. Aspart 8 unit with breakfast and 6 with dinner. New insulin regimen was extensively discussed with patient and patient was able to teach back.  Monitoring was RR during hospital course.     On day of discharge, patient was urinating, ambulating, and tolerating a regular diet without difficulty. Pain was well controlled on PO medication. She was discharged home on HD3 in stable condition with instructions to follow up with MFM in the next week to review glucose log.    Consults: None    Significant Diagnostic Studies:  Recent Labs   Lab 10/04/24  1637   WBC 7.78   HGB 12.7   HCT 36.8*   MCV 81*           Treatments:   1. Insulin regimen titration     Disposition: Home or Self Care    Patient Instructions:   Current Discharge Medication List        START taking these medications    Details   famotidine (PEPCID) 20 MG tablet Take 1 tablet (20 mg total) by mouth 2 (two) times daily.  Qty: 60 tablet, Refills: 11      insulin aspart U-100 (NOVOLOG) 100 unit/mL (3 mL) InPn pen Inject 8  "Units into the skin daily with breakfast, THEN 6 Units daily with dinner or evening meal. (Discard pen 28 days after initial use).  Qty: 6 mL, Refills: 0           CONTINUE these medications which have CHANGED    Details   insulin NPH isoph U-100 human 100 unit/mL (3 mL) InPn Inject 12 Units into the skin before breakfast AND 14 Units every evening. (Discard pen 14 days after initial use).  Qty: 6 mL, Refills: 0      !! insulin syringe-needle U-100 0.3 mL 30 gauge x 5/16" Syrg Use insulin needles with insulin as prescribed    Associated Diagnoses: Diet controlled gestational diabetes mellitus (GDM) in second trimester       !! - Potential duplicate medications found. Please discuss with provider.        CONTINUE these medications which have NOT CHANGED    Details   acyclovir (ZOVIRAX) 400 MG tablet Take 1 tablet (400 mg total) by mouth 5 (five) times daily. for 5 days  Qty: 25 tablet, Refills: 0      blood sugar diagnostic Strp Patient to test first thing in morning and 2 hours after breakfast, lunch, and dinner  Qty: 200 strip, Refills: 2    Comments: Please dispense whatever type test strips patient has gotten previously and/or is covered by her insurance.  Associated Diagnoses: Gestational diabetes mellitus (GDM) in second trimester, gestational diabetes method of control unspecified; Diet controlled gestational diabetes mellitus (GDM) in second trimester      cholestyramine, with sugar, 4 gram Powd Take 4 g by mouth once daily.  Qty: 348.6 g, Refills: 0      dextroamphetamine-amphetamine (ADDERALL XR) 30 MG 24 hr capsule Take 30 mg by mouth every morning.      !! insulin syringe-needle U-100 0.3 mL 30 gauge x 5/16" Syrg Use insulin needles with insulin as prescribed    Associated Diagnoses: Gestational diabetes mellitus (GDM) in second trimester, gestational diabetes method of control unspecified; Diet controlled gestational diabetes mellitus (GDM) in second trimester      !! insulin syringe-needle U-100 0.3 mL " "30 gauge x 5/16" Syrg Use insulin needles with insulin as prescribed      pen needle, diabetic (BD ULTRA-FINE KAYLIE PEN NEEDLE) 32 gauge x 5/32" Ndle use twice daily.  Qty: 100 each, Refills: 3      prenatal vit/iron fum/folic ac (PRENATAL 1+1 ORAL) Take by mouth.      sertraline (ZOLOFT) 25 MG tablet Take 25 mg by mouth once daily.       !! - Potential duplicate medications found. Please discuss with provider.        STOP taking these medications       amoxicillin-clavulanate 875-125mg (AUGMENTIN) 875-125 mg per tablet Comments:   Reason for Stopping:         naproxen sodium (ANAPROX) 550 MG tablet Comments:   Reason for Stopping:         nystatin (MYCOSTATIN) powder Comments:   Reason for Stopping:               Discharge Procedure Orders   Diet Adult Regular     Notify your health care provider if you experience any of the following:  temperature >100.4     Notify your health care provider if you experience any of the following:  persistent nausea and vomiting or diarrhea     Notify your health care provider if you experience any of the following:  severe uncontrolled pain     Notify your health care provider if you experience any of the following:  difficulty breathing or increased cough     Notify your health care provider if you experience any of the following:  severe persistent headache     Notify your health care provider if you experience any of the following:  worsening rash     Notify your health care provider if you experience any of the following:  persistent dizziness, light-headedness, or visual disturbances     Notify your health care provider if you experience any of the following:  increased confusion or weakness     Notify your health care provider if you experience any of the following:   Order Comments: Glucose consistently over 200 or less than 70        Follow-up Information       [facility]. Go in 1 week(s).    Specialty: Maternal and Fetal Medicine  Why: Review glucose " log  Contact information:  [address]  [facility]  [facility] [address] [phone]  Additional information:  Turn at Entrance 1 on [facility] in [facility] and take elevators to Floor 2. Follow signs to Hospital. Take Hospital Elevators to Floor 4 for [address].                           Farida Givens MD  Obstetrics and Gynecology, PGY-2

## 2024-10-06 NOTE — NURSING
Discharge instructions reviewed and given to patient. Patient verbalized understanding.   Discharged patient in good condition, ambulatory accompanied by her father and mother.

## 2024-10-06 NOTE — DISCHARGE INSTRUCTIONS
..Contact your primary OB or after hours at [phone] if you experience any of the following:    Contractions every 7-10 minutes for 1 or more hours.   A sudden gush or constant leaking of fluid.  Heavy vaginal bleeding.   If you experience a constant headache, blurry vision, pain underneath your right rib, or sudden swelling of your hands, feet, and face.   If you are 28 weeks pregnant or greater, you can measure kick counts with a goal of 10 or more movements within 2 hours.     Remember to stay hydrated; drink 8-10 bottles of water a day.     Maintain all follow-up appointments.

## 2024-10-06 NOTE — CARE UPDATE
PM NST    FHT: 150 bpm, moderate variability, +accels, - decels; (reassuring)  [place]: no contractions or uterine irritability     Monitoring reassuring  Continue NST BID    Odette Hawkins MD  OB/GYN PGY-2

## 2024-10-07 ENCOUNTER — TELEPHONE (OUTPATIENT)
Dept: MATERNAL FETAL MEDICINE | Facility: CLINIC | Age: 24
End: 2024-10-07
Payer: MEDICAID

## 2024-10-07 NOTE — TELEPHONE ENCOUNTER
Called yordy in response to [employer] message sent to us yesterday, Sunday, regarding her Dexcom sensor needing to be replaced.    Sent my chart message.  Pt then called back stating she had called [facility] this am for new sensors.    States She is now on Aspart 8/x/6 and NPH 12 am and 14 pm per patient.   States her sugars have been from  since hospital d/c yesterday afternoon.    Will f/u with her regarding sensor.

## 2024-10-08 ENCOUNTER — PATIENT MESSAGE (OUTPATIENT)
Dept: MATERNAL FETAL MEDICINE | Facility: CLINIC | Age: 24
End: 2024-10-08
Payer: MEDICAID

## 2024-10-08 ENCOUNTER — TELEPHONE (OUTPATIENT)
Dept: MATERNAL FETAL MEDICINE | Facility: CLINIC | Age: 24
End: 2024-10-08
Payer: MEDICAID

## 2024-10-08 NOTE — TELEPHONE ENCOUNTER
Katie from Dr Martinez office call to ask for hospital records from Patients recent admission and her current dose of insulin.    Pt is in their office right now.    Records sent.  Current insult regimen given to her.    Encouraged her to have patient send her blood sugars to me at our office before 4:30 pm TODAY and she has diabetic education on Thursday.     Patient then called me directly from the exam room to ask is her appt tomorrow virtural.  It is and I told her to have blood sugars to me by 4:30 pm this afternoon.  She agreed.

## 2024-10-09 ENCOUNTER — TELEPHONE (OUTPATIENT)
Dept: MATERNAL FETAL MEDICINE | Facility: CLINIC | Age: 24
End: 2024-10-09

## 2024-10-09 ENCOUNTER — OFFICE VISIT (OUTPATIENT)
Dept: MATERNAL FETAL MEDICINE | Facility: CLINIC | Age: 24
End: 2024-10-09
Payer: MEDICAID

## 2024-10-09 DIAGNOSIS — O24.414 INSULIN CONTROLLED GESTATIONAL DIABETES MELLITUS (GDM) IN THIRD TRIMESTER: Primary | ICD-10-CM

## 2024-10-09 DIAGNOSIS — O24.414 INSULIN CONTROLLED GESTATIONAL DIABETES MELLITUS (GDM) IN SECOND TRIMESTER: Primary | ICD-10-CM

## 2024-10-09 PROCEDURE — 1111F DSCHRG MED/CURRENT MED MERGE: CPT | Mod: CPTII,95,, | Performed by: STUDENT IN AN ORGANIZED HEALTH CARE EDUCATION/TRAINING PROGRAM

## 2024-10-09 PROCEDURE — 3044F HG A1C LEVEL LT 7.0%: CPT | Mod: CPTII,95,, | Performed by: STUDENT IN AN ORGANIZED HEALTH CARE EDUCATION/TRAINING PROGRAM

## 2024-10-09 PROCEDURE — 99214 OFFICE O/P EST MOD 30 MIN: CPT | Mod: TH,95,, | Performed by: STUDENT IN AN ORGANIZED HEALTH CARE EDUCATION/TRAINING PROGRAM

## 2024-10-09 RX ORDER — FLASH GLUCOSE SENSOR
1 KIT MISCELLANEOUS ONCE
Qty: 1 KIT | Refills: 0 | Status: SHIPPED | OUTPATIENT
Start: 2024-10-09 | End: 2024-10-10

## 2024-10-09 NOTE — TELEPHONE ENCOUNTER
Message recd from PA Dept today:    Good afternoon,    This message is in regards to device for FREESTYLE TYLER 3 SENSOR and/or FREESTYLE TYLER 3 PLUS SENSOR. Under the patient's insurance plan--[employer]    [employer] Formulary:  DEXCOM G6 , SENSOR & TRANSMITTER  DEXCOM G7  & SENSOR  FREESTYLE TYLER 14 DAY READER & SENSOR  FREESTYLE TYLER 2 READER & SENSOR    If you would like to send in a prescription for the alternative, please do so as soon as possible so that we can initiate a prior authorization if needed. Thank you.    PS: FREESTYLE TYLER 3 SENSOR & FREESTYLE TYLER 3 PLUS SENSOR -- on backorder at this time.    Sincerely,  Prior Authorization Department  [facility]      Order placed for Tyler 2

## 2024-10-09 NOTE — ASSESSMENT & PLAN NOTE
Gestational Diabetes  Previously counseled- see prior notes for full recommendations  Recently admitted to [facility] for hyperglycemia  She was discharged on NPH 12/14 with Aspart 8/x/6  I reviewed her blood sugars today- changes made: NPH 16/18 with Aspart 8/x/6  She reports eating Froot Loops with skim milk this morning and her CGM is reading a value of 240. She was unaware of the sugar content in Froot Loops.  I instructed her to repeat her blood sugar and give herself 2 units of Aspart. She is not at home but will retake blood sugar at home and contact us or Dr. Martinez if > 160.    Recommendations:  MFM will review blood sugars in 1 week via portal.   Diabetic education follow up tomorrow  MFM growth scheduled on 10/21 with visit  We reinforced checking 4 x/day and reinforced goal blood sugars

## 2024-10-09 NOTE — PROGRESS NOTES
"The patient location is: [place]  The chief complaint leading to consultation is: Blood glucose assessment    Visit type: audiovisual    Face to Face time with patient: 10  20 minutes of total time spent on the encounter, which includes face to face time and non-face to face time preparing to see the patient (eg, review of tests), Obtaining and/or reviewing separately obtained history, Documenting clinical information in the electronic or other health record, Independently interpreting results (not separately reported) and communicating results to the patient/family/caregiver, or Care coordination (not separately reported).         Each patient to whom he or she provides medical services by telemedicine is:  (1) informed of the relationship between the physician and patient and the respective role of any other health care provider with respect to management of the patient; and (2) notified that he or she may decline to receive medical services by telemedicine and may withdraw from such care at any time.    Notes:   Maternal Fetal Medicine Follow up  SUBJECTIVE:     Margret Krueger is a 24 y.o.  female with IUP at 28w0d who is seen for MFM follow up for management of:    Problem   Gestational Diabetes Mellitus (Gdm) in Third Trimester     Previous notes reviewed.   No changes to medical, surgical, family, social, or obstetric history.    Review of patient's allergies indicates:   Allergen Reactions    Ether for anesthesia Other (See Comments)     "The medicine when you get put to sleep for surgery. It makes me jump."     Care team members:  Michelle SANTO - Primary OB    ASSESSMENT/PLAN:     24 y.o.  female with IUP at 28w0d presents for MFM follow up.    Gestational diabetes mellitus (GDM) in third trimester  Gestational Diabetes  Previously counseled- see prior notes for full recommendations  Recently admitted to [facility] for hyperglycemia  She was discharged on NPH  with Aspart   I reviewed her " blood sugars today- changes made: NPH 16/18 with Aspart 8/x/6  She reports eating Froot Loops with skim milk this morning and her CGM is reading a value of 240. She was unaware of the sugar content in Froot Loops.  I instructed her to repeat her blood sugar and give herself 2 units of Aspart. She is not at home but will retake blood sugar at home and contact us or Dr. Martinez if > 160.    Recommendations:  MFM will review blood sugars in 1 week via portal.   Diabetic education follow up tomorrow  MFM growth scheduled on 10/21 with visit  We reinforced checking 4 x/day and reinforced goal blood sugars      Norma Velasquez  Maternal-Fetal Medicine    Electronically Signed by Norma Velasquez October 9, 2024

## 2024-10-14 ENCOUNTER — TELEPHONE (OUTPATIENT)
Dept: MATERNAL FETAL MEDICINE | Facility: CLINIC | Age: 24
End: 2024-10-14
Payer: MEDICAID

## 2024-10-14 RX ORDER — FLASH GLUCOSE SENSOR
KIT MISCELLANEOUS
Qty: 1 KIT | Refills: 0 | Status: SHIPPED | OUTPATIENT
Start: 2024-10-14

## 2024-10-14 NOTE — TELEPHONE ENCOUNTER
Margret called to see if she could drink Egg nog.   Informed egg nog sugar count was too high.    Discussed locating nutritional guidelines on food and looking for sugar and carbohydrate numbers.   Patient unaware of how many carbs dietician said she should have for a snack.    Instructed to contact Dietician for guidelines and for snack suggestions.    Patient unhappy with dietary restrictions.   Reminded her of affects of too much sugar on her baby.

## 2024-10-21 ENCOUNTER — OFFICE VISIT (OUTPATIENT)
Dept: MATERNAL FETAL MEDICINE | Facility: CLINIC | Age: 24
End: 2024-10-21
Payer: MEDICAID

## 2024-10-21 ENCOUNTER — PROCEDURE VISIT (OUTPATIENT)
Dept: MATERNAL FETAL MEDICINE | Facility: CLINIC | Age: 24
End: 2024-10-21
Payer: MEDICAID

## 2024-10-21 VITALS
WEIGHT: 240.31 LBS | HEART RATE: 113 BPM | HEIGHT: 58 IN | BODY MASS INDEX: 50.44 KG/M2 | DIASTOLIC BLOOD PRESSURE: 79 MMHG | SYSTOLIC BLOOD PRESSURE: 129 MMHG

## 2024-10-21 DIAGNOSIS — Z36.89 ENCOUNTER FOR ULTRASOUND TO ASSESS FETAL GROWTH: ICD-10-CM

## 2024-10-21 DIAGNOSIS — O24.414 INSULIN CONTROLLED GESTATIONAL DIABETES MELLITUS (GDM) IN THIRD TRIMESTER: Primary | ICD-10-CM

## 2024-10-21 DIAGNOSIS — O99.213 OBESITY AFFECTING PREGNANCY IN THIRD TRIMESTER, UNSPECIFIED OBESITY TYPE: ICD-10-CM

## 2024-10-21 DIAGNOSIS — O24.414 INSULIN CONTROLLED GESTATIONAL DIABETES MELLITUS (GDM) IN SECOND TRIMESTER: ICD-10-CM

## 2024-10-21 PROCEDURE — 99214 OFFICE O/P EST MOD 30 MIN: CPT | Mod: S$PBB,TH,, | Performed by: OBSTETRICS & GYNECOLOGY

## 2024-10-21 PROCEDURE — 3008F BODY MASS INDEX DOCD: CPT | Mod: CPTII,,, | Performed by: OBSTETRICS & GYNECOLOGY

## 2024-10-21 PROCEDURE — 3078F DIAST BP <80 MM HG: CPT | Mod: CPTII,,, | Performed by: OBSTETRICS & GYNECOLOGY

## 2024-10-21 PROCEDURE — 99213 OFFICE O/P EST LOW 20 MIN: CPT | Mod: PBBFAC,TH,PN | Performed by: OBSTETRICS & GYNECOLOGY

## 2024-10-21 PROCEDURE — 76816 OB US FOLLOW-UP PER FETUS: CPT | Mod: 26,S$PBB,, | Performed by: OBSTETRICS & GYNECOLOGY

## 2024-10-21 PROCEDURE — 3044F HG A1C LEVEL LT 7.0%: CPT | Mod: CPTII,,, | Performed by: OBSTETRICS & GYNECOLOGY

## 2024-10-21 PROCEDURE — 1159F MED LIST DOCD IN RCRD: CPT | Mod: CPTII,,, | Performed by: OBSTETRICS & GYNECOLOGY

## 2024-10-21 PROCEDURE — 99999 PR PBB SHADOW E&M-EST. PATIENT-LVL III: CPT | Mod: PBBFAC,,, | Performed by: OBSTETRICS & GYNECOLOGY

## 2024-10-21 PROCEDURE — 76816 OB US FOLLOW-UP PER FETUS: CPT | Mod: PBBFAC,PN | Performed by: OBSTETRICS & GYNECOLOGY

## 2024-10-21 PROCEDURE — 3074F SYST BP LT 130 MM HG: CPT | Mod: CPTII,,, | Performed by: OBSTETRICS & GYNECOLOGY

## 2024-10-21 NOTE — ASSESSMENT & PLAN NOTE
Previously counseled- see prior notes for full recommendations  Recently admitted to [facility] for hyperglycemia  Current regimen: NPH 16/18 with Aspart 8/x/6        Recommendations:  Change regimen: NPH 20 AM/20 HS with Aspart 10/x/8  MFM will review blood sugars in 1 week via portal.   MFM growth scheduled in 3 weeks with initiation of twice weekly testing at 32 weeks  We reinforced checking 4 x/day and reinforced goal blood sugars  Given her early diagnosis of GDM and risk factors, would manage delivery timing and testing as a pre-gestational diabetic:  38 0/7 to 38 and 6/7 weeks if under good control without comorbidities  37 0/7 to 37 and 6/7 weeks if longstanding diabetes or poorly controlled, polyhydramnios, EFW>90th percentile, or BMI >= 40  36 0/7 to 37 and 6/7 weeks if vascular complications, prior stillbirth or other complicating conditions.  Recommend consideration of earlier delivery if IUGR, HTN, or other complications  Recommend offering  for delivery is EFW is 4500g or more near the time of delivery

## 2024-10-21 NOTE — PROGRESS NOTES
"Maternal Fetal Medicine follow up consult    SUBJECTIVE:     Margret Krueger is a 24 y.o.  female with IUP at 29w5d who is seen in follow up consultation by MFM.  Pregnancy complications include:   Problem   Gestational Diabetes Mellitus (Gdm) in Third Trimester   Obesity Affecting Pregnancy in Third Trimester       Previous notes reviewed.   No changes to medical, surgical, family, social, or obstetric history.    Interval history since last MFM visit: no interval problems    Medications reviewed.    Care team members:  Dr. Martinez - Primary OB       OBJECTIVE:   /79 (BP Location: Left arm, Patient Position: Lying)   Pulse (!) 113   Ht 4' 10" (1.473 m)   Wt 109 kg (240 lb 4.8 oz)   LMP 2024 (Exact Date)   BMI 50.22 kg/m²       Ultrasound performed. See viewpoint for full ultrasound report.  Fetal size is AGA with the EFW at the 41% and the AC at the 55%. The EFW is 1471 g.  A limited repeat fetal anatomic survey shows no abnormalities of the structures that were adequately imaged.  AFV is normal.         ASSESSMENT/PLAN:     24 y.o.  female with IUP at 29w5d    Gestational diabetes mellitus (GDM) in third trimester  Previously counseled- see prior notes for full recommendations  Recently admitted to [facility] for hyperglycemia  Current regimen: NPH 16/18 with Aspart 8/x/6        Recommendations:  Change regimen: NPH 20 AM/20 HS with Aspart 10/x/8  MFM will review blood sugars in 1 week via portal.   MFM growth scheduled in 3 weeks with initiation of twice weekly testing at 32 weeks  We reinforced checking 4 x/day and reinforced goal blood sugars  Given her early diagnosis of GDM and risk factors, would manage delivery timing and testing as a pre-gestational diabetic:  38 0/7 to 38 and 6/7 weeks if under good control without comorbidities  37 0/7 to 37 and 6/7 weeks if longstanding diabetes or poorly controlled, polyhydramnios, EFW>90th percentile, or BMI >= 40  36 0/7 to 37 and 6/7 " weeks if vascular complications, prior stillbirth or other complicating conditions.  Recommend consideration of earlier delivery if IUGR, HTN, or other complications  Recommend offering  for delivery is EFW is 4500g or more near the time of delivery        Obesity affecting pregnancy in third trimester  Recommend LMWH prophylaxis during hospitalization    F/u in 3 weeks for MFM visit; 1 week virtual to review blood glucose log  F/u in 3 weeks for US

## 2024-10-24 ENCOUNTER — TELEPHONE (OUTPATIENT)
Dept: MATERNAL FETAL MEDICINE | Facility: CLINIC | Age: 24
End: 2024-10-24
Payer: MEDICAID

## 2024-10-24 RX ORDER — INSULIN ASPART 100 [IU]/ML
INJECTION, SOLUTION INTRAVENOUS; SUBCUTANEOUS
Qty: 6 ML | Refills: 0 | Status: SHIPPED | OUTPATIENT
Start: 2024-10-24 | End: 2024-10-25 | Stop reason: SDUPTHER

## 2024-10-24 NOTE — TELEPHONE ENCOUNTER
Pt called and stated she in on last day of Novalog and needs refill sent to [facility] in [place]

## 2024-10-25 ENCOUNTER — TELEPHONE (OUTPATIENT)
Dept: MATERNAL FETAL MEDICINE | Facility: CLINIC | Age: 24
End: 2024-10-25
Payer: MEDICAID

## 2024-10-25 RX ORDER — INSULIN ASPART 100 [IU]/ML
INJECTION, SOLUTION INTRAVENOUS; SUBCUTANEOUS
Qty: 30 ML | Refills: 0 | Status: SHIPPED | OUTPATIENT
Start: 2024-10-25 | End: 2025-04-23

## 2024-10-25 NOTE — TELEPHONE ENCOUNTER
Pt called and stated that the pharmacy told her that they will not fill her Novolog Rx since it hasn't been 30 days and that she is currently out. Confirmed with pt how much Novolog she is taking. Advised pt we will contact pharmacy and see what needs to be done to get the Rx filled.     Contacted [facility] pharmacy, pharmacist confirmed that Medicaid would not cover the prescription as it was filled earlier this month.     Sent message to ALEX SANTO asking if yesterdays Rx can be sent to [facility] instead of preferred.

## 2024-10-25 NOTE — TELEPHONE ENCOUNTER
Spoke with pharmacy tech to see if Aspart Rx had been pushed through. They stated that Medicaid would not allow it as she just had the Rx filled on 10/6/24 and that it would not be refilled until 10/30/24.    Pharmacy also confirmed that she should have had more than enough with her previous dosing and the current Rx should have lasted her over 30 days if she is taking 10u with breakfast and 8u with dinner.

## 2024-10-28 DIAGNOSIS — Z36.89 ENCOUNTER FOR ULTRASOUND TO ASSESS FETAL GROWTH: ICD-10-CM

## 2024-10-28 DIAGNOSIS — O24.414 INSULIN CONTROLLED GESTATIONAL DIABETES MELLITUS (GDM) IN THIRD TRIMESTER: Primary | ICD-10-CM

## 2024-10-28 DIAGNOSIS — O24.414 INSULIN CONTROLLED GESTATIONAL DIABETES MELLITUS (GDM) IN SECOND TRIMESTER: Primary | ICD-10-CM

## 2024-10-28 DIAGNOSIS — O24.414 INSULIN CONTROLLED GESTATIONAL DIABETES MELLITUS (GDM) IN THIRD TRIMESTER: ICD-10-CM

## 2024-10-28 RX ORDER — HUMAN INSULIN 100 [IU]/ML
20 INJECTION, SUSPENSION SUBCUTANEOUS 2 TIMES DAILY
Qty: 36 ML | Refills: 3 | Status: CANCELLED | OUTPATIENT
Start: 2024-10-28 | End: 2025-10-28

## 2024-10-28 RX ORDER — INSULIN HUMAN 100 [IU]/ML
20 INJECTION, SUSPENSION SUBCUTANEOUS
Qty: 12 ML | Refills: 11 | Status: SHIPPED | OUTPATIENT
Start: 2024-10-28 | End: 2025-10-28

## 2024-10-28 RX ORDER — INSULIN HUMAN 100 [IU]/ML
4 INJECTION, SUSPENSION SUBCUTANEOUS 2 TIMES DAILY
Qty: 15 ML | Refills: 3 | Status: CANCELLED | OUTPATIENT
Start: 2024-10-28 | End: 2025-10-28

## 2024-10-29 ENCOUNTER — PATIENT MESSAGE (OUTPATIENT)
Dept: MATERNAL FETAL MEDICINE | Facility: CLINIC | Age: 24
End: 2024-10-29
Payer: MEDICAID

## 2024-11-04 ENCOUNTER — PATIENT MESSAGE (OUTPATIENT)
Dept: MATERNAL FETAL MEDICINE | Facility: CLINIC | Age: 24
End: 2024-11-04
Payer: MEDICAID

## 2024-11-04 ENCOUNTER — TELEPHONE (OUTPATIENT)
Dept: MATERNAL FETAL MEDICINE | Facility: CLINIC | Age: 24
End: 2024-11-04
Payer: MEDICAID

## 2024-11-04 NOTE — TELEPHONE ENCOUNTER
Called patient to have her send in her blood sugars this afternoon prior to her virtual appt tomorrow.    Pt said she would and that they have been good.    Pt said Dr Martinez is asking when he can take the baby.  Explained to her about her gestation, almost 32 weeks and lung maturity.  Told her that maybe Dr Velasquez will start to think about that after her next u/s with us on 11/11 and depemnding on her blood sugars and if any other complications arise.    Patient in good mood, verbalized understanding.  Stated she would send in blood sugars today.

## 2024-11-05 ENCOUNTER — OFFICE VISIT (OUTPATIENT)
Dept: MATERNAL FETAL MEDICINE | Facility: CLINIC | Age: 24
End: 2024-11-05
Payer: MEDICAID

## 2024-11-05 ENCOUNTER — TELEPHONE (OUTPATIENT)
Dept: MATERNAL FETAL MEDICINE | Facility: CLINIC | Age: 24
End: 2024-11-05
Payer: MEDICAID

## 2024-11-05 DIAGNOSIS — O24.414 INSULIN CONTROLLED GESTATIONAL DIABETES MELLITUS (GDM) IN THIRD TRIMESTER: Primary | ICD-10-CM

## 2024-11-05 DIAGNOSIS — O24.414 INSULIN CONTROLLED GESTATIONAL DIABETES MELLITUS (GDM) IN SECOND TRIMESTER: ICD-10-CM

## 2024-11-05 PROCEDURE — 99214 OFFICE O/P EST MOD 30 MIN: CPT | Mod: TH,95,, | Performed by: STUDENT IN AN ORGANIZED HEALTH CARE EDUCATION/TRAINING PROGRAM

## 2024-11-05 NOTE — PROGRESS NOTES
"The patient location is: home  The chief complaint leading to consultation is: blood glucose    Visit type: audiovisual    Face to Face time with patient: 10  20 minutes of total time spent on the encounter, which includes face to face time and non-face to face time preparing to see the patient (eg, review of tests), Obtaining and/or reviewing separately obtained history, Documenting clinical information in the electronic or other health record, Independently interpreting results (not separately reported) and communicating results to the patient/family/caregiver, or Care coordination (not separately reported).         Each patient to whom he or she provides medical services by telemedicine is:  (1) informed of the relationship between the physician and patient and the respective role of any other health care provider with respect to management of the patient; and (2) notified that he or she may decline to receive medical services by telemedicine and may withdraw from such care at any time.    Notes:   Maternal Fetal Medicine Follow up  SUBJECTIVE:     Margret Krueger is a 24 y.o.  female with IUP at 31w6d who is seen for MFM follow up for management of:    Problem   Gestational Diabetes Mellitus (Gdm) in Third Trimester     Previous notes reviewed.   No changes to medical, surgical, family, social, or obstetric history.      Review of patient's allergies indicates:   Allergen Reactions    Ether for anesthesia Other (See Comments)     "The medicine when you get put to sleep for surgery. It makes me jump."     Care team members:  MD Michelle - Primary OB  OBJECTIVE:     ASSESSMENT/PLAN:     24 y.o.  female with IUP at 31w6d presents for MFM follow up.    Gestational diabetes mellitus (GDM) in third trimester  Previously counseled- see prior notes for full recommendations  Recently admitted to [facility] for hyperglycemia  Current regimen: NPH 20 AM/20 HS with Aspart 10/x/8  Upon review of her logs " included below, she did not have insulin until the . Logs are high on -. On , has significant hypoglycemia for all values between 36-86.  She reports low 2 am readings.  Will decrease NPH to 20/18 and continue Aspart 10/x/8- stressed repeatedly the importance of taking insulin and documenting so we can assist her in making changes to insulin.  I reviewed precautions for hypoglycemia and instructed her to seek immediate medical attention if blood glucose <70        Recommendations:  Change regimen: NPH 20 AM/18 HS with Aspart 10/x/8  MFM will review blood sugars in 1 week  Twice weekly testing  We reinforced checking 4 x/day and reinforced goal blood sugars  Given her early diagnosis of GDM and risk factors, would manage delivery timing and testing as a pre-gestational diabetic:  38 0/7 to 38 and 6/7 weeks if under good control without comorbidities  37 0/7 to 37 and 6/7 weeks if longstanding diabetes or poorly controlled, polyhydramnios, EFW>90th percentile, or BMI >= 40  36 0/7 to 37 and 6/7 weeks if vascular complications, prior stillbirth or other complicating conditions.  Recommend consideration of earlier delivery if IUGR, HTN, or other complications  Recommend offering  for delivery is EFW is 4500g or more near the time of delivery        FOLLOW UP:   Scheduled for next week  Norma Velasquez  Maternal-Fetal Medicine    Electronically Signed by Norma Velasquez 2024

## 2024-11-05 NOTE — TELEPHONE ENCOUNTER
"Called Margret as what she sent for her BS log was just the Dexcom report.    Unable to tell when meals were eaten.    Margret read me her numbers for past week stating "I'll give you the numbers, I know when I ate".   Those numbers recorded, with a note, on her BS sheet and scanned into media attached to her virtual visit for today.    Reinforced need to call office for numbers below 70.     Explained again using the paper log and writing her blood sugar down fasting and 2 hours after each meal.   Pt expressed frustration and says this process is taking too long    Reminded me again Dr Martinez wants a date he can take the baby.    Informed her that information and her blood sugars would be passed on to Dr Velasquez.  Confirmed her virtual appt this am time  "

## 2024-11-05 NOTE — ASSESSMENT & PLAN NOTE
Previously counseled- see prior notes for full recommendations  Recently admitted to [facility] for hyperglycemia  Current regimen: NPH 20 AM/20 HS with Aspart 10/x/8  Upon review of her logs included below, she did not have insulin until the . Logs are high on -. On , has significant hypoglycemia for all values between 36-86.  She reports low 2 am readings.  Will decrease NPH to 20/18 and continue Aspart 10/x/8- stressed repeatedly the importance of taking insulin and documenting so we can assist her in making changes to insulin.  I reviewed precautions for hypoglycemia and instructed her to seek immediate medical attention if blood glucose <70        Recommendations:  Change regimen: NPH 20 AM/18 HS with Aspart 10/x/8  MFM will review blood sugars in 1 week  MFM growth scheduled in 3 weeks with initiation of twice weekly testing at 32 weeks  We reinforced checking 4 x/day and reinforced goal blood sugars  Given her early diagnosis of GDM and risk factors, would manage delivery timing and testing as a pre-gestational diabetic:  38 0/7 to 38 and 6/7 weeks if under good control without comorbidities  37 0/7 to 37 and 6/7 weeks if longstanding diabetes or poorly controlled, polyhydramnios, EFW>90th percentile, or BMI >= 40  36 0/7 to 37 and 6/7 weeks if vascular complications, prior stillbirth or other complicating conditions.  Recommend consideration of earlier delivery if IUGR, HTN, or other complications  Recommend offering  for delivery is EFW is 4500g or more near the time of delivery

## 2024-11-06 ENCOUNTER — TELEPHONE (OUTPATIENT)
Dept: MATERNAL FETAL MEDICINE | Facility: CLINIC | Age: 24
End: 2024-11-06
Payer: MEDICAID

## 2024-11-06 NOTE — TELEPHONE ENCOUNTER
Pt called stating she saw on news that smoking and GDM are harmful to her baby and then she looked it up on the internet and found that to be true.    Pt worried and states she quit smoking cold turkey last night and now she is doing everything to keep her blood sugars low.     Reassurance and support offered.  Discussed kick counts if she is worried about the baby.     She also reports her fasting BS have been in 70's.  Encouraged to keep a high protein, no sugar, low carb snack next to her during night and prior to bed.    Discussed sugar free gum as a way to help with nicotine cravings.    Pt expressed gratitude to our whole staff for helping her have a safe pregnancy.

## 2024-11-07 ENCOUNTER — TELEPHONE (OUTPATIENT)
Dept: MATERNAL FETAL MEDICINE | Facility: CLINIC | Age: 24
End: 2024-11-07
Payer: MEDICAID

## 2024-11-08 DIAGNOSIS — Z79.4 DIABETES MELLITUS DURING PREGNANCY TREATED WITH INSULIN: Primary | ICD-10-CM

## 2024-11-08 DIAGNOSIS — O24.919 DIABETES MELLITUS DURING PREGNANCY TREATED WITH INSULIN: Primary | ICD-10-CM

## 2024-11-08 DIAGNOSIS — Z36.89 ENCOUNTER FOR ULTRASOUND TO ASSESS FETAL GROWTH: ICD-10-CM

## 2024-11-08 NOTE — TELEPHONE ENCOUNTER
Dari message sent to pt to remind her to send in her BS late on  for Monday am appt.    Also told her to check her appts that have been scheduled for her  testing (BPPS) with us on .   If needed, we can try to adjust any that she may have conflict with.    Dr Martinez office doing NST's on .

## 2024-11-11 ENCOUNTER — OFFICE VISIT (OUTPATIENT)
Dept: MATERNAL FETAL MEDICINE | Facility: CLINIC | Age: 24
End: 2024-11-11
Payer: MEDICAID

## 2024-11-11 ENCOUNTER — PROCEDURE VISIT (OUTPATIENT)
Dept: MATERNAL FETAL MEDICINE | Facility: CLINIC | Age: 24
End: 2024-11-11
Payer: MEDICAID

## 2024-11-11 ENCOUNTER — PATIENT MESSAGE (OUTPATIENT)
Dept: MATERNAL FETAL MEDICINE | Facility: CLINIC | Age: 24
End: 2024-11-11

## 2024-11-11 VITALS
HEART RATE: 98 BPM | SYSTOLIC BLOOD PRESSURE: 110 MMHG | BODY MASS INDEX: 52.64 KG/M2 | HEIGHT: 58 IN | DIASTOLIC BLOOD PRESSURE: 74 MMHG | WEIGHT: 250.75 LBS

## 2024-11-11 DIAGNOSIS — O24.414 INSULIN CONTROLLED GESTATIONAL DIABETES MELLITUS (GDM) IN THIRD TRIMESTER: Primary | ICD-10-CM

## 2024-11-11 DIAGNOSIS — Z36.89 ENCOUNTER FOR ULTRASOUND TO ASSESS FETAL GROWTH: ICD-10-CM

## 2024-11-11 DIAGNOSIS — O24.414 INSULIN CONTROLLED GESTATIONAL DIABETES MELLITUS (GDM) IN THIRD TRIMESTER: ICD-10-CM

## 2024-11-11 DIAGNOSIS — O99.213 OBESITY AFFECTING PREGNANCY IN THIRD TRIMESTER, UNSPECIFIED OBESITY TYPE: ICD-10-CM

## 2024-11-11 PROCEDURE — 3078F DIAST BP <80 MM HG: CPT | Mod: CPTII,,, | Performed by: STUDENT IN AN ORGANIZED HEALTH CARE EDUCATION/TRAINING PROGRAM

## 2024-11-11 PROCEDURE — 3008F BODY MASS INDEX DOCD: CPT | Mod: CPTII,,, | Performed by: STUDENT IN AN ORGANIZED HEALTH CARE EDUCATION/TRAINING PROGRAM

## 2024-11-11 PROCEDURE — 3074F SYST BP LT 130 MM HG: CPT | Mod: CPTII,,, | Performed by: STUDENT IN AN ORGANIZED HEALTH CARE EDUCATION/TRAINING PROGRAM

## 2024-11-11 PROCEDURE — 99999 PR PBB SHADOW E&M-EST. PATIENT-LVL III: CPT | Mod: PBBFAC,,, | Performed by: STUDENT IN AN ORGANIZED HEALTH CARE EDUCATION/TRAINING PROGRAM

## 2024-11-11 PROCEDURE — 3044F HG A1C LEVEL LT 7.0%: CPT | Mod: CPTII,,, | Performed by: STUDENT IN AN ORGANIZED HEALTH CARE EDUCATION/TRAINING PROGRAM

## 2024-11-11 PROCEDURE — 1160F RVW MEDS BY RX/DR IN RCRD: CPT | Mod: CPTII,,, | Performed by: STUDENT IN AN ORGANIZED HEALTH CARE EDUCATION/TRAINING PROGRAM

## 2024-11-11 PROCEDURE — 76816 OB US FOLLOW-UP PER FETUS: CPT | Mod: 26,S$PBB,, | Performed by: STUDENT IN AN ORGANIZED HEALTH CARE EDUCATION/TRAINING PROGRAM

## 2024-11-11 PROCEDURE — 76819 FETAL BIOPHYS PROFIL W/O NST: CPT | Mod: PBBFAC,PN | Performed by: STUDENT IN AN ORGANIZED HEALTH CARE EDUCATION/TRAINING PROGRAM

## 2024-11-11 PROCEDURE — 1159F MED LIST DOCD IN RCRD: CPT | Mod: CPTII,,, | Performed by: STUDENT IN AN ORGANIZED HEALTH CARE EDUCATION/TRAINING PROGRAM

## 2024-11-11 PROCEDURE — 99214 OFFICE O/P EST MOD 30 MIN: CPT | Mod: S$PBB,TH,, | Performed by: STUDENT IN AN ORGANIZED HEALTH CARE EDUCATION/TRAINING PROGRAM

## 2024-11-11 PROCEDURE — 99213 OFFICE O/P EST LOW 20 MIN: CPT | Mod: PBBFAC,TH,PN | Performed by: STUDENT IN AN ORGANIZED HEALTH CARE EDUCATION/TRAINING PROGRAM

## 2024-11-11 NOTE — ASSESSMENT & PLAN NOTE
Previously counseled- see prior notes for full recommendations  Recently admitted to [facility] for hyperglycemia  Current regimen: NPH 20 AM/18 HS with Aspart 10/x/8  Blood sugars overall in range. She does have a 225 post lunch which she attributes to a large lunch and didn't take her breakfast Aspart.    Recommendations   Continue regimen: NPH 20 AM/18 HS with Aspart 10/x/8  MFM will review blood sugars in 1 week  Twice weekly PNT scheduled with MFM and Dr. Martinez. MFM will complete weekly BPP  We reinforced checking 4 x/day and reinforced goal blood sugars  Given her early diagnosis of GDM and risk factors, would manage delivery timing and testing as a pre-gestational diabetic:  36 0/7 to 37 and 6/7 weeks if vascular complications, prior stillbirth or other complicating conditions.  Recommend consideration of earlier delivery if IUGR, HTN, or other complications  Recommend offering  for delivery is EFW is 4500g or more near the time of delivery

## 2024-11-11 NOTE — PROGRESS NOTES
"Maternal Fetal Medicine Follow up  SUBJECTIVE:     Margret Krueger is a 24 y.o.  female with IUP at 32w5d who is seen for MFM follow up for management of:    Problem   Obesity Affecting Pregnancy in Third Trimester   Gestational Diabetes Mellitus (Gdm) in Third Trimester     Previous notes reviewed.   No changes to medical, surgical, family, social, or obstetric history.        Review of patient's allergies indicates:   Allergen Reactions    Ether for anesthesia Other (See Comments)     "The medicine when you get put to sleep for surgery. It makes me jump."     Care team members:  Dr. Martinez - Primary OB  OBJECTIVE:   Blood Pressure: /74 (BP Location: Left forearm, Patient Position: Lying)   Pulse 98   Ht 4' 10" (1.473 m)   Wt 113.7 kg (250 lb 12.4 oz)   LMP 2024 (Exact Date)   BMI 52.41 kg/m²   Ultrasound performed. See viewpoint for full ultrasound report.  Fetal size is AGA with the EFW plotting at the 15% and the AC plotting at the 13%.   The EFW is 1805 g.  A limited repeat fetal anatomic survey shows no abnormalities of the structures that were adequately imaged.    The BPP score is reassuring at 8/8, and the AFV is normal.   ASSESSMENT/PLAN:     24 y.o.  female with IUP at 32w5d presents for MFM follow up.    Gestational diabetes mellitus (GDM) in third trimester  Previously counseled- see prior notes for full recommendations  Recently admitted to [facility] for hyperglycemia  Current regimen: NPH 20 AM/18 HS with Aspart 10/x/8  Blood sugars overall in range. She does have a 225 post lunch which she attributes to a large lunch and didn't take her breakfast Aspart.    Recommendations   Continue regimen: NPH 20 AM/18 HS with Aspart 10/x/8  MFM will review blood sugars in 1 week  Twice weekly PNT scheduled with MFM and Dr. Martinez. MFM will complete weekly BPP  We reinforced checking 4 x/day and reinforced goal blood sugars  Given her early diagnosis of GDM and risk factors, would " manage delivery timing and testing as a pre-gestational diabetic:  36 0/7 to 37 and 6/7 weeks if vascular complications, prior stillbirth or other complicating conditions.  Recommend consideration of earlier delivery if IUGR, HTN, or other complications  Recommend offering  for delivery is EFW is 4500g or more near the time of delivery        Obesity affecting pregnancy in third trimester  Recommend LMWH prophylaxis during hospitalization      FOLLOW UP:   Weekly BPPs  Norma Velasquez  Maternal-Fetal Medicine    Electronically Signed by Norma Velasquez 2024

## 2024-11-14 ENCOUNTER — TELEPHONE (OUTPATIENT)
Dept: MATERNAL FETAL MEDICINE | Facility: CLINIC | Age: 24
End: 2024-11-14
Payer: MEDICAID

## 2024-11-14 NOTE — TELEPHONE ENCOUNTER
Katie with Dr Martinez office called to let me know Dr Martinez was about to do c/section on patient due to elevated Bp and him not being comfortable with FHR tracing.    NO Mfm consult requested.  Call was for notification purposes only.    MFM notified.

## 2024-11-22 ENCOUNTER — PATIENT MESSAGE (OUTPATIENT)
Dept: RESEARCH | Facility: HOSPITAL | Age: 24
End: 2024-11-22
Payer: MEDICAID

## 2025-07-16 ENCOUNTER — OCCUPATIONAL HEALTH (OUTPATIENT)
Dept: URGENT CARE | Facility: CLINIC | Age: 25
End: 2025-07-16

## 2025-07-16 PROCEDURE — 80305 DRUG TEST PRSMV DIR OPT OBS: CPT | Mod: S$GLB,,, | Performed by: EMERGENCY MEDICINE
